# Patient Record
Sex: MALE | Race: WHITE | NOT HISPANIC OR LATINO | Employment: OTHER | URBAN - METROPOLITAN AREA
[De-identification: names, ages, dates, MRNs, and addresses within clinical notes are randomized per-mention and may not be internally consistent; named-entity substitution may affect disease eponyms.]

---

## 2021-01-01 ENCOUNTER — APPOINTMENT (INPATIENT)
Dept: RADIOLOGY | Facility: HOSPITAL | Age: 66
DRG: 871 | End: 2021-01-01
Payer: COMMERCIAL

## 2021-01-01 ENCOUNTER — HOSPITAL ENCOUNTER (INPATIENT)
Facility: HOSPITAL | Age: 66
LOS: 7 days | DRG: 871 | End: 2021-08-22
Attending: EMERGENCY MEDICINE | Admitting: ANESTHESIOLOGY
Payer: COMMERCIAL

## 2021-01-01 ENCOUNTER — APPOINTMENT (EMERGENCY)
Dept: CT IMAGING | Facility: HOSPITAL | Age: 66
End: 2021-01-01
Payer: COMMERCIAL

## 2021-01-01 ENCOUNTER — APPOINTMENT (EMERGENCY)
Dept: CT IMAGING | Facility: HOSPITAL | Age: 66
DRG: 871 | End: 2021-01-01
Payer: COMMERCIAL

## 2021-01-01 ENCOUNTER — APPOINTMENT (OUTPATIENT)
Dept: LAB | Facility: HOSPITAL | Age: 66
DRG: 871 | End: 2021-01-01
Payer: COMMERCIAL

## 2021-01-01 ENCOUNTER — APPOINTMENT (EMERGENCY)
Dept: RADIOLOGY | Facility: HOSPITAL | Age: 66
DRG: 871 | End: 2021-01-01
Payer: COMMERCIAL

## 2021-01-01 ENCOUNTER — TELEPHONE (OUTPATIENT)
Dept: HEMATOLOGY ONCOLOGY | Facility: CLINIC | Age: 66
End: 2021-01-01

## 2021-01-01 ENCOUNTER — TELEPHONE (OUTPATIENT)
Dept: UROLOGY | Facility: CLINIC | Age: 66
End: 2021-01-01

## 2021-01-01 ENCOUNTER — APPOINTMENT (INPATIENT)
Dept: ULTRASOUND IMAGING | Facility: HOSPITAL | Age: 66
DRG: 871 | End: 2021-01-01
Payer: COMMERCIAL

## 2021-01-01 ENCOUNTER — TELEPHONE (OUTPATIENT)
Dept: UROLOGY | Facility: AMBULATORY SURGERY CENTER | Age: 66
End: 2021-01-01

## 2021-01-01 ENCOUNTER — PROCEDURE VISIT (OUTPATIENT)
Dept: UROLOGY | Facility: CLINIC | Age: 66
End: 2021-01-01
Payer: COMMERCIAL

## 2021-01-01 ENCOUNTER — HOSPITAL ENCOUNTER (EMERGENCY)
Facility: HOSPITAL | Age: 66
Discharge: HOME | End: 2021-08-01
Attending: EMERGENCY MEDICINE | Admitting: EMERGENCY MEDICINE
Payer: COMMERCIAL

## 2021-01-01 ENCOUNTER — APPOINTMENT (EMERGENCY)
Dept: VASCULAR ULTRASOUND | Facility: HOSPITAL | Age: 66
End: 2021-01-01
Payer: COMMERCIAL

## 2021-01-01 ENCOUNTER — PATIENT OUTREACH (OUTPATIENT)
Dept: UROLOGY | Facility: AMBULATORY SURGERY CENTER | Age: 66
End: 2021-01-01

## 2021-01-01 VITALS
HEART RATE: 93 BPM | RESPIRATION RATE: 22 BRPM | DIASTOLIC BLOOD PRESSURE: 70 MMHG | TEMPERATURE: 97.8 F | SYSTOLIC BLOOD PRESSURE: 144 MMHG | OXYGEN SATURATION: 98 %

## 2021-01-01 VITALS
WEIGHT: 128.09 LBS | SYSTOLIC BLOOD PRESSURE: 113 MMHG | HEART RATE: 110 BPM | OXYGEN SATURATION: 74 % | HEIGHT: 69 IN | TEMPERATURE: 98.1 F | BODY MASS INDEX: 18.97 KG/M2 | RESPIRATION RATE: 22 BRPM | DIASTOLIC BLOOD PRESSURE: 79 MMHG

## 2021-01-01 VITALS
BODY MASS INDEX: 18.99 KG/M2 | WEIGHT: 128.2 LBS | DIASTOLIC BLOOD PRESSURE: 82 MMHG | HEIGHT: 69 IN | SYSTOLIC BLOOD PRESSURE: 140 MMHG | HEART RATE: 140 BPM

## 2021-01-01 DIAGNOSIS — J18.9 PNEUMONIA: ICD-10-CM

## 2021-01-01 DIAGNOSIS — J96.01 ACUTE RESPIRATORY FAILURE WITH HYPOXIA (HCC): ICD-10-CM

## 2021-01-01 DIAGNOSIS — C79.51 BONE METASTASES (HCC): ICD-10-CM

## 2021-01-01 DIAGNOSIS — N42.89 PROSTATE MASS: ICD-10-CM

## 2021-01-01 DIAGNOSIS — U07.1 COVID-19: Primary | ICD-10-CM

## 2021-01-01 DIAGNOSIS — N40.2 PROSTATE NODULE: ICD-10-CM

## 2021-01-01 DIAGNOSIS — Z71.2 ENCOUNTER TO DISCUSS TEST RESULTS: ICD-10-CM

## 2021-01-01 DIAGNOSIS — R97.20 ELEVATED PSA, GREATER THAN OR EQUAL TO 20 NG/ML: ICD-10-CM

## 2021-01-01 DIAGNOSIS — S32.609A: ICD-10-CM

## 2021-01-01 DIAGNOSIS — R93.7 ABNORMAL CT SCAN, LUMBAR SPINE: Primary | ICD-10-CM

## 2021-01-01 DIAGNOSIS — N40.2 PROSTATE NODULE: Primary | ICD-10-CM

## 2021-01-01 DIAGNOSIS — M54.50 ACUTE LOW BACK PAIN: Primary | ICD-10-CM

## 2021-01-01 DIAGNOSIS — R74.8 ALKALINE PHOSPHATASE RAISED: ICD-10-CM

## 2021-01-01 DIAGNOSIS — Z51.5 COMFORT MEASURES ONLY STATUS: ICD-10-CM

## 2021-01-01 LAB
ABO GROUP BLD: NORMAL
ABO GROUP BLD: NORMAL
ALBUMIN SERPL BCP-MCNC: 1.4 G/DL (ref 3.5–5)
ALBUMIN SERPL BCP-MCNC: 1.5 G/DL (ref 3.5–5)
ALBUMIN SERPL BCP-MCNC: 1.6 G/DL (ref 3.5–5)
ALBUMIN SERPL BCP-MCNC: 1.8 G/DL (ref 3.5–5)
ALBUMIN SERPL BCP-MCNC: 1.9 G/DL (ref 3.5–5)
ALBUMIN SERPL BCP-MCNC: 3.5 G/DL (ref 3.5–5)
ALP SERPL-CCNC: 301 U/L (ref 46–116)
ALP SERPL-CCNC: 317 U/L (ref 46–116)
ALP SERPL-CCNC: 318 U/L (ref 46–116)
ALP SERPL-CCNC: 328 U/L (ref 46–116)
ALP SERPL-CCNC: 350 U/L (ref 46–116)
ALP SERPL-CCNC: 353 U/L (ref 46–116)
ALT SERPL W P-5'-P-CCNC: 101 U/L (ref 12–78)
ALT SERPL W P-5'-P-CCNC: 27 U/L (ref 12–78)
ALT SERPL W P-5'-P-CCNC: 84 U/L (ref 12–78)
ALT SERPL W P-5'-P-CCNC: 87 U/L (ref 12–78)
ALT SERPL W P-5'-P-CCNC: 88 U/L (ref 12–78)
ALT SERPL W P-5'-P-CCNC: 89 U/L (ref 12–78)
ANION GAP SERPL CALCULATED.3IONS-SCNC: 10 MMOL/L (ref 4–13)
ANION GAP SERPL CALCULATED.3IONS-SCNC: 11 MMOL/L (ref 4–13)
ANION GAP SERPL CALCULATED.3IONS-SCNC: 11 MMOL/L (ref 4–13)
ANION GAP SERPL CALCULATED.3IONS-SCNC: 12 MMOL/L (ref 4–13)
ANION GAP SERPL CALCULATED.3IONS-SCNC: 12 MMOL/L (ref 4–13)
ANION GAP SERPL CALCULATED.3IONS-SCNC: 13 MMOL/L (ref 4–13)
ANION GAP SERPL CALCULATED.3IONS-SCNC: 9 MMOL/L (ref 4–13)
APTT PPP: 102 SECONDS (ref 23–37)
APTT PPP: 27 SECONDS (ref 23–37)
APTT PPP: 27 SECONDS (ref 23–37)
APTT PPP: 32 SECONDS (ref 23–37)
APTT PPP: 44 SECONDS (ref 23–37)
APTT PPP: 50 SECONDS (ref 23–37)
APTT PPP: 50 SECONDS (ref 23–37)
APTT PPP: 77 SECONDS (ref 23–37)
AST SERPL W P-5'-P-CCNC: 123 U/L (ref 5–45)
AST SERPL W P-5'-P-CCNC: 28 U/L (ref 5–45)
AST SERPL W P-5'-P-CCNC: 51 U/L (ref 5–45)
AST SERPL W P-5'-P-CCNC: 53 U/L (ref 5–45)
AST SERPL W P-5'-P-CCNC: 59 U/L (ref 5–45)
AST SERPL W P-5'-P-CCNC: 80 U/L (ref 5–45)
ATRIAL RATE: 100 BPM
ATRIAL RATE: 109 BPM
ATRIAL RATE: 94 BPM
BACTERIA BLD CULT: NORMAL
BACTERIA BLD CULT: NORMAL
BASOPHILS # BLD AUTO: 0.01 THOUSANDS/ΜL (ref 0–0.1)
BASOPHILS # BLD AUTO: 0.02 THOUSANDS/ΜL (ref 0–0.1)
BASOPHILS # BLD AUTO: 0.02 THOUSANDS/ΜL (ref 0–0.1)
BASOPHILS # BLD AUTO: 0.03 THOUSANDS/ΜL (ref 0–0.1)
BASOPHILS # BLD AUTO: 0.03 THOUSANDS/ΜL (ref 0–0.1)
BASOPHILS # BLD MANUAL: 0 THOUSAND/UL (ref 0–0.1)
BASOPHILS # BLD MANUAL: 0 THOUSAND/UL (ref 0–0.1)
BASOPHILS NFR BLD AUTO: 0 % (ref 0–1)
BASOPHILS NFR MAR MANUAL: 0 % (ref 0–1)
BASOPHILS NFR MAR MANUAL: 0 % (ref 0–1)
BILIRUB DIRECT SERPL-MCNC: 0.38 MG/DL (ref 0–0.2)
BILIRUB SERPL-MCNC: 0.39 MG/DL (ref 0.2–1)
BILIRUB SERPL-MCNC: 0.45 MG/DL (ref 0.2–1)
BILIRUB SERPL-MCNC: 0.46 MG/DL (ref 0.2–1)
BILIRUB SERPL-MCNC: 0.52 MG/DL (ref 0.2–1)
BILIRUB SERPL-MCNC: 0.58 MG/DL (ref 0.2–1)
BILIRUB SERPL-MCNC: 0.81 MG/DL (ref 0.2–1)
BLD GP AB SCN SERPL QL: NEGATIVE
BUN SERPL-MCNC: 26 MG/DL (ref 5–25)
BUN SERPL-MCNC: 30 MG/DL (ref 5–25)
BUN SERPL-MCNC: 32 MG/DL (ref 5–25)
BUN SERPL-MCNC: 33 MG/DL (ref 5–25)
BUN SERPL-MCNC: 35 MG/DL (ref 5–25)
CA-I BLD-SCNC: 1.08 MMOL/L (ref 1.12–1.32)
CALCIUM ALBUM COR SERPL-MCNC: 9.1 MG/DL (ref 8.3–10.1)
CALCIUM ALBUM COR SERPL-MCNC: 9.1 MG/DL (ref 8.3–10.1)
CALCIUM ALBUM COR SERPL-MCNC: 9.2 MG/DL (ref 8.3–10.1)
CALCIUM ALBUM COR SERPL-MCNC: 9.3 MG/DL (ref 8.3–10.1)
CALCIUM SERPL-MCNC: 7.1 MG/DL (ref 8.3–10.1)
CALCIUM SERPL-MCNC: 7.1 MG/DL (ref 8.3–10.1)
CALCIUM SERPL-MCNC: 7.4 MG/DL (ref 8.3–10.1)
CALCIUM SERPL-MCNC: 7.6 MG/DL (ref 8.3–10.1)
CALCIUM SERPL-MCNC: 8.5 MG/DL (ref 8.3–10.1)
CHLORIDE SERPL-SCNC: 107 MMOL/L (ref 100–108)
CHLORIDE SERPL-SCNC: 108 MMOL/L (ref 100–108)
CHLORIDE SERPL-SCNC: 110 MMOL/L (ref 100–108)
CHLORIDE SERPL-SCNC: 111 MMOL/L (ref 100–108)
CHLORIDE SERPL-SCNC: 111 MMOL/L (ref 100–108)
CHLORIDE SERPL-SCNC: 112 MMOL/L (ref 100–108)
CHLORIDE SERPL-SCNC: 99 MMOL/L (ref 100–108)
CO2 SERPL-SCNC: 19 MMOL/L (ref 21–32)
CO2 SERPL-SCNC: 20 MMOL/L (ref 21–32)
CO2 SERPL-SCNC: 21 MMOL/L (ref 21–32)
CO2 SERPL-SCNC: 21 MMOL/L (ref 21–32)
CO2 SERPL-SCNC: 23 MMOL/L (ref 21–32)
CO2 SERPL-SCNC: 26 MMOL/L (ref 21–32)
CO2 SERPL-SCNC: 26 MMOL/L (ref 21–32)
CREAT SERPL-MCNC: 0.77 MG/DL (ref 0.6–1.3)
CREAT SERPL-MCNC: 0.83 MG/DL (ref 0.6–1.3)
CREAT SERPL-MCNC: 0.85 MG/DL (ref 0.6–1.3)
CREAT SERPL-MCNC: 0.91 MG/DL (ref 0.6–1.3)
CREAT SERPL-MCNC: 0.94 MG/DL (ref 0.6–1.3)
CREAT SERPL-MCNC: 1.24 MG/DL (ref 0.6–1.3)
CREAT SERPL-MCNC: 1.31 MG/DL (ref 0.6–1.3)
CRP SERPL QL: 13.3 MG/L
CRP SERPL QL: 162.9 MG/L
CRP SERPL QL: 226.9 MG/L
CRP SERPL QL: 32.1 MG/L
D DIMER PPP FEU-MCNC: >20 UG/ML FEU
D DIMER PPP FEU-MCNC: >20 UG/ML FEU
EOSINOPHIL # BLD AUTO: 0 THOUSAND/ΜL (ref 0–0.61)
EOSINOPHIL # BLD AUTO: 0.01 THOUSAND/ΜL (ref 0–0.61)
EOSINOPHIL # BLD MANUAL: 0 THOUSAND/UL (ref 0–0.4)
EOSINOPHIL # BLD MANUAL: 0 THOUSAND/UL (ref 0–0.4)
EOSINOPHIL NFR BLD AUTO: 0 % (ref 0–6)
EOSINOPHIL NFR BLD MANUAL: 0 % (ref 0–6)
EOSINOPHIL NFR BLD MANUAL: 0 % (ref 0–6)
ERYTHROCYTE [DISTWIDTH] IN BLOOD BY AUTOMATED COUNT: 14.2 % (ref 11.6–15.1)
ERYTHROCYTE [DISTWIDTH] IN BLOOD BY AUTOMATED COUNT: 14.6 % (ref 11.6–15.1)
ERYTHROCYTE [DISTWIDTH] IN BLOOD BY AUTOMATED COUNT: 14.7 % (ref 11.6–15.1)
ERYTHROCYTE [DISTWIDTH] IN BLOOD BY AUTOMATED COUNT: 14.9 % (ref 11.6–15.1)
GFR SERPL CREATININE-BSD FRML MDRD: 56 ML/MIN/1.73SQ M
GFR SERPL CREATININE-BSD FRML MDRD: 60 ML/MIN/1.73SQ M
GFR SERPL CREATININE-BSD FRML MDRD: 84 ML/MIN/1.73SQ M
GFR SERPL CREATININE-BSD FRML MDRD: 87 ML/MIN/1.73SQ M
GFR SERPL CREATININE-BSD FRML MDRD: 90 ML/MIN/1.73SQ M
GFR SERPL CREATININE-BSD FRML MDRD: 91 ML/MIN/1.73SQ M
GFR SERPL CREATININE-BSD FRML MDRD: 94 ML/MIN/1.73SQ M
GLUCOSE SERPL-MCNC: 111 MG/DL (ref 65–140)
GLUCOSE SERPL-MCNC: 121 MG/DL (ref 65–140)
GLUCOSE SERPL-MCNC: 124 MG/DL (ref 65–140)
GLUCOSE SERPL-MCNC: 127 MG/DL (ref 65–140)
GLUCOSE SERPL-MCNC: 130 MG/DL (ref 65–140)
GLUCOSE SERPL-MCNC: 138 MG/DL (ref 65–140)
GLUCOSE SERPL-MCNC: 98 MG/DL (ref 65–140)
HBV CORE AB SER QL: NORMAL
HBV CORE IGM SER QL: NORMAL
HBV SURFACE AG SER QL: NORMAL
HCT VFR BLD AUTO: 36.4 % (ref 36.5–49.3)
HCT VFR BLD AUTO: 36.6 % (ref 36.5–49.3)
HCT VFR BLD AUTO: 37 % (ref 36.5–49.3)
HCT VFR BLD AUTO: 38 % (ref 36.5–49.3)
HCT VFR BLD AUTO: 38.1 % (ref 36.5–49.3)
HCT VFR BLD AUTO: 39.9 % (ref 36.5–49.3)
HCT VFR BLD AUTO: 43.4 % (ref 36.5–49.3)
HCT VFR BLD AUTO: 44.6 % (ref 36.5–49.3)
HCV AB SER QL: NORMAL
HGB BLD-MCNC: 11.7 G/DL (ref 12–17)
HGB BLD-MCNC: 12.1 G/DL (ref 12–17)
HGB BLD-MCNC: 12.2 G/DL (ref 12–17)
HGB BLD-MCNC: 12.2 G/DL (ref 12–17)
HGB BLD-MCNC: 12.5 G/DL (ref 12–17)
HGB BLD-MCNC: 13.4 G/DL (ref 12–17)
HGB BLD-MCNC: 13.9 G/DL (ref 12–17)
HGB BLD-MCNC: 14.5 G/DL (ref 12–17)
HIV 1+2 AB+HIV1 P24 AG SERPL QL IA: NORMAL
HIV1 P24 AG SER QL: NORMAL
IMM GRANULOCYTES # BLD AUTO: 0.03 THOUSAND/UL (ref 0–0.2)
IMM GRANULOCYTES # BLD AUTO: 0.14 THOUSAND/UL (ref 0–0.2)
IMM GRANULOCYTES # BLD AUTO: 0.2 THOUSAND/UL (ref 0–0.2)
IMM GRANULOCYTES NFR BLD AUTO: 0 % (ref 0–2)
IMM GRANULOCYTES NFR BLD AUTO: 1 % (ref 0–2)
IMM GRANULOCYTES NFR BLD AUTO: 2 % (ref 0–2)
INR PPP: 1.48 (ref 0.84–1.19)
INR PPP: 1.54 (ref 0.84–1.19)
INR PPP: 1.57 (ref 0.84–1.19)
LYMPHOCYTES # BLD AUTO: 0.44 THOUSAND/UL (ref 0.6–4.47)
LYMPHOCYTES # BLD AUTO: 0.46 THOUSAND/UL (ref 0.6–4.47)
LYMPHOCYTES # BLD AUTO: 0.57 THOUSANDS/ΜL (ref 0.6–4.47)
LYMPHOCYTES # BLD AUTO: 0.87 THOUSANDS/ΜL (ref 0.6–4.47)
LYMPHOCYTES # BLD AUTO: 0.94 THOUSANDS/ΜL (ref 0.6–4.47)
LYMPHOCYTES # BLD AUTO: 1.09 THOUSANDS/ΜL (ref 0.6–4.47)
LYMPHOCYTES # BLD AUTO: 1.12 THOUSANDS/ΜL (ref 0.6–4.47)
LYMPHOCYTES # BLD AUTO: 4 % (ref 14–44)
LYMPHOCYTES # BLD AUTO: 4 % (ref 14–44)
LYMPHOCYTES NFR BLD AUTO: 16 % (ref 14–44)
LYMPHOCYTES NFR BLD AUTO: 4 % (ref 14–44)
LYMPHOCYTES NFR BLD AUTO: 6 % (ref 14–44)
LYMPHOCYTES NFR BLD AUTO: 6 % (ref 14–44)
LYMPHOCYTES NFR BLD AUTO: 9 % (ref 14–44)
MAGNESIUM SERPL-MCNC: 2.1 MG/DL (ref 1.6–2.6)
MAGNESIUM SERPL-MCNC: 2.2 MG/DL (ref 1.6–2.6)
MAGNESIUM SERPL-MCNC: 2.4 MG/DL (ref 1.6–2.6)
MCH RBC QN AUTO: 26.7 PG (ref 26.8–34.3)
MCH RBC QN AUTO: 26.8 PG (ref 26.8–34.3)
MCH RBC QN AUTO: 26.9 PG (ref 26.8–34.3)
MCH RBC QN AUTO: 27.2 PG (ref 26.8–34.3)
MCH RBC QN AUTO: 27.2 PG (ref 26.8–34.3)
MCH RBC QN AUTO: 27.3 PG (ref 26.8–34.3)
MCH RBC QN AUTO: 27.3 PG (ref 26.8–34.3)
MCH RBC QN AUTO: 27.5 PG (ref 26.8–34.3)
MCHC RBC AUTO-ENTMCNC: 32 G/DL (ref 31.4–37.4)
MCHC RBC AUTO-ENTMCNC: 32 G/DL (ref 31.4–37.4)
MCHC RBC AUTO-ENTMCNC: 32.1 G/DL (ref 31.4–37.4)
MCHC RBC AUTO-ENTMCNC: 32.5 G/DL (ref 31.4–37.4)
MCHC RBC AUTO-ENTMCNC: 32.7 G/DL (ref 31.4–37.4)
MCHC RBC AUTO-ENTMCNC: 32.9 G/DL (ref 31.4–37.4)
MCHC RBC AUTO-ENTMCNC: 33.3 G/DL (ref 31.4–37.4)
MCHC RBC AUTO-ENTMCNC: 33.6 G/DL (ref 31.4–37.4)
MCV RBC AUTO: 81 FL (ref 82–98)
MCV RBC AUTO: 83 FL (ref 82–98)
MCV RBC AUTO: 84 FL (ref 82–98)
MCV RBC AUTO: 85 FL (ref 82–98)
MONOCYTES # BLD AUTO: 0.35 THOUSAND/UL (ref 0–1.22)
MONOCYTES # BLD AUTO: 0.44 THOUSAND/UL (ref 0–1.22)
MONOCYTES # BLD AUTO: 0.54 THOUSAND/ΜL (ref 0.17–1.22)
MONOCYTES # BLD AUTO: 0.68 THOUSAND/ΜL (ref 0.17–1.22)
MONOCYTES # BLD AUTO: 0.7 THOUSAND/ΜL (ref 0.17–1.22)
MONOCYTES # BLD AUTO: 0.76 THOUSAND/ΜL (ref 0.17–1.22)
MONOCYTES # BLD AUTO: 1.31 THOUSAND/ΜL (ref 0.17–1.22)
MONOCYTES NFR BLD AUTO: 19 % (ref 4–12)
MONOCYTES NFR BLD AUTO: 4 % (ref 4–12)
MONOCYTES NFR BLD AUTO: 4 % (ref 4–12)
MONOCYTES NFR BLD AUTO: 5 % (ref 4–12)
MONOCYTES NFR BLD AUTO: 6 % (ref 4–12)
MONOCYTES NFR BLD: 3 % (ref 4–12)
MONOCYTES NFR BLD: 4 % (ref 4–12)
MRSA NOSE QL CULT: NORMAL
NEUTROPHILS # BLD AUTO: 10.23 THOUSANDS/ΜL (ref 1.85–7.62)
NEUTROPHILS # BLD AUTO: 11.88 THOUSANDS/ΜL (ref 1.85–7.62)
NEUTROPHILS # BLD AUTO: 12.96 THOUSANDS/ΜL (ref 1.85–7.62)
NEUTROPHILS # BLD AUTO: 14.54 THOUSANDS/ΜL (ref 1.85–7.62)
NEUTROPHILS # BLD AUTO: 4.36 THOUSANDS/ΜL (ref 1.85–7.62)
NEUTROPHILS # BLD MANUAL: 10.74 THOUSAND/UL (ref 1.85–7.62)
NEUTROPHILS # BLD MANUAL: 9.86 THOUSAND/UL (ref 1.85–7.62)
NEUTS SEG NFR BLD AUTO: 65 % (ref 43–75)
NEUTS SEG NFR BLD AUTO: 83 % (ref 43–75)
NEUTS SEG NFR BLD AUTO: 88 % (ref 43–75)
NEUTS SEG NFR BLD AUTO: 89 % (ref 43–75)
NEUTS SEG NFR BLD AUTO: 90 % (ref 43–75)
NEUTS SEG NFR BLD AUTO: 91 % (ref 43–75)
NEUTS SEG NFR BLD AUTO: 93 % (ref 43–75)
NRBC BLD AUTO-RTO: 0 /100 WBCS
NT-PROBNP SERPL-MCNC: 154 PG/ML
NT-PROBNP SERPL-MCNC: 946 PG/ML
P AXIS: 49 DEGREES
P AXIS: 57 DEGREES
P AXIS: 61 DEGREES
PHOSPHATE SERPL-MCNC: 2.5 MG/DL (ref 2.3–4.1)
PHOSPHATE SERPL-MCNC: 2.5 MG/DL (ref 2.3–4.1)
PHOSPHATE SERPL-MCNC: 2.9 MG/DL (ref 2.3–4.1)
PHOSPHATE SERPL-MCNC: 3 MG/DL (ref 2.3–4.1)
PHOSPHATE SERPL-MCNC: 3.1 MG/DL (ref 2.3–4.1)
PLATELET # BLD AUTO: 174 THOUSANDS/UL (ref 149–390)
PLATELET # BLD AUTO: 228 THOUSANDS/UL (ref 149–390)
PLATELET # BLD AUTO: 232 THOUSANDS/UL (ref 149–390)
PLATELET # BLD AUTO: 251 THOUSANDS/UL (ref 149–390)
PLATELET # BLD AUTO: 254 THOUSANDS/UL (ref 149–390)
PLATELET # BLD AUTO: 269 THOUSANDS/UL (ref 149–390)
PLATELET # BLD AUTO: 272 THOUSANDS/UL (ref 149–390)
PLATELET # BLD AUTO: 279 THOUSANDS/UL (ref 149–390)
PLATELET BLD QL SMEAR: ADEQUATE
PLATELET BLD QL SMEAR: ADEQUATE
PMV BLD AUTO: 10.2 FL (ref 8.9–12.7)
PMV BLD AUTO: 10.3 FL (ref 8.9–12.7)
PMV BLD AUTO: 10.3 FL (ref 8.9–12.7)
PMV BLD AUTO: 10.4 FL (ref 8.9–12.7)
PMV BLD AUTO: 10.5 FL (ref 8.9–12.7)
PMV BLD AUTO: 9.8 FL (ref 8.9–12.7)
POTASSIUM SERPL-SCNC: 3.6 MMOL/L (ref 3.5–5.3)
POTASSIUM SERPL-SCNC: 3.8 MMOL/L (ref 3.5–5.3)
POTASSIUM SERPL-SCNC: 3.8 MMOL/L (ref 3.5–5.3)
POTASSIUM SERPL-SCNC: 4.1 MMOL/L (ref 3.5–5.3)
POTASSIUM SERPL-SCNC: 4.3 MMOL/L (ref 3.5–5.3)
POTASSIUM SERPL-SCNC: 4.4 MMOL/L (ref 3.5–5.3)
POTASSIUM SERPL-SCNC: 4.5 MMOL/L (ref 3.5–5.3)
PR INTERVAL: 114 MS
PR INTERVAL: 122 MS
PR INTERVAL: 130 MS
PREALB SERPL-MCNC: 6.1 MG/DL (ref 18–40)
PROCALCITONIN SERPL-MCNC: 0.19 NG/ML
PROCALCITONIN SERPL-MCNC: 0.25 NG/ML
PROCALCITONIN SERPL-MCNC: 0.76 NG/ML
PROCALCITONIN SERPL-MCNC: 1.03 NG/ML
PROT SERPL-MCNC: 5.6 G/DL (ref 6.4–8.2)
PROT SERPL-MCNC: 5.8 G/DL (ref 6.4–8.2)
PROT SERPL-MCNC: 5.9 G/DL (ref 6.4–8.2)
PROT SERPL-MCNC: 6 G/DL (ref 6.4–8.2)
PROT SERPL-MCNC: 7.1 G/DL (ref 6.4–8.2)
PROT SERPL-MCNC: 7.4 G/DL (ref 6.4–8.2)
PROTHROMBIN TIME: 17.2 SECONDS (ref 11.6–14.5)
PROTHROMBIN TIME: 17.8 SECONDS (ref 11.6–14.5)
PROTHROMBIN TIME: 18 SECONDS (ref 11.6–14.5)
PSA SERPL-MCNC: 220.8 NG/ML (ref 0–4)
QRS AXIS: 66 DEGREES
QRS AXIS: 67 DEGREES
QRS AXIS: 68 DEGREES
QRSD INTERVAL: 84 MS
QRSD INTERVAL: 90 MS
QRSD INTERVAL: 94 MS
QT INTERVAL: 336 MS
QT INTERVAL: 356 MS
QT INTERVAL: 374 MS
QTC INTERVAL: 420 MS
QTC INTERVAL: 479 MS
QTC INTERVAL: 482 MS
RBC # BLD AUTO: 4.36 MILLION/UL (ref 3.88–5.62)
RBC # BLD AUTO: 4.43 MILLION/UL (ref 3.88–5.62)
RBC # BLD AUTO: 4.45 MILLION/UL (ref 3.88–5.62)
RBC # BLD AUTO: 4.57 MILLION/UL (ref 3.88–5.62)
RBC # BLD AUTO: 4.59 MILLION/UL (ref 3.88–5.62)
RBC # BLD AUTO: 4.9 MILLION/UL (ref 3.88–5.62)
RBC # BLD AUTO: 5.09 MILLION/UL (ref 3.88–5.62)
RBC # BLD AUTO: 5.4 MILLION/UL (ref 3.88–5.62)
RH BLD: POSITIVE
RH BLD: POSITIVE
SARS-COV-2 RNA RESP QL NAA+PROBE: POSITIVE
SODIUM SERPL-SCNC: 137 MMOL/L (ref 136–145)
SODIUM SERPL-SCNC: 138 MMOL/L (ref 136–145)
SODIUM SERPL-SCNC: 141 MMOL/L (ref 136–145)
SODIUM SERPL-SCNC: 143 MMOL/L (ref 136–145)
SODIUM SERPL-SCNC: 143 MMOL/L (ref 136–145)
SODIUM SERPL-SCNC: 145 MMOL/L (ref 136–145)
SODIUM SERPL-SCNC: 145 MMOL/L (ref 136–145)
SPECIMEN EXPIRATION DATE: NORMAL
T WAVE AXIS: 60 DEGREES
T WAVE AXIS: 67 DEGREES
T WAVE AXIS: 69 DEGREES
TOTAL CELLS COUNTED SPEC: 100
TROPONIN I SERPL-MCNC: <0.02 NG/ML
TROPONIN I SERPL-MCNC: <0.02 NG/ML
VARIANT LYMPHS # BLD AUTO: 2 %
VENTRICULAR RATE: 100 BPM
VENTRICULAR RATE: 109 BPM
VENTRICULAR RATE: 94 BPM
WBC # BLD AUTO: 10.95 THOUSAND/UL (ref 4.31–10.16)
WBC # BLD AUTO: 11.37 THOUSAND/UL (ref 4.31–10.16)
WBC # BLD AUTO: 11.55 THOUSAND/UL (ref 4.31–10.16)
WBC # BLD AUTO: 12.31 THOUSAND/UL (ref 4.31–10.16)
WBC # BLD AUTO: 13.59 THOUSAND/UL (ref 4.31–10.16)
WBC # BLD AUTO: 14.31 THOUSAND/UL (ref 4.31–10.16)
WBC # BLD AUTO: 16.4 THOUSAND/UL (ref 4.31–10.16)
WBC # BLD AUTO: 6.83 THOUSAND/UL (ref 4.31–10.16)

## 2021-01-01 PROCEDURE — 55700 PR BIOPSY OF PROSTATE,NEEDLE/PUNCH: CPT | Performed by: UROLOGY

## 2021-01-01 PROCEDURE — G0416 PROSTATE BIOPSY, ANY MTHD: HCPCS | Performed by: PATHOLOGY

## 2021-01-01 PROCEDURE — 85610 PROTHROMBIN TIME: CPT | Performed by: NURSE PRACTITIONER

## 2021-01-01 PROCEDURE — 36415 COLL VENOUS BLD VENIPUNCTURE: CPT | Performed by: EMERGENCY MEDICINE

## 2021-01-01 PROCEDURE — 99222 1ST HOSP IP/OBS MODERATE 55: CPT | Performed by: PHYSICIAN ASSISTANT

## 2021-01-01 PROCEDURE — NC001 PR NO CHARGE: Performed by: PHYSICIAN ASSISTANT

## 2021-01-01 PROCEDURE — 99292 CRITICAL CARE ADDL 30 MIN: CPT | Performed by: NURSE PRACTITIONER

## 2021-01-01 PROCEDURE — 97167 OT EVAL HIGH COMPLEX 60 MIN: CPT

## 2021-01-01 PROCEDURE — 84145 PROCALCITONIN (PCT): CPT | Performed by: PHYSICIAN ASSISTANT

## 2021-01-01 PROCEDURE — 86704 HEP B CORE ANTIBODY TOTAL: CPT | Performed by: PHYSICIAN ASSISTANT

## 2021-01-01 PROCEDURE — 88342 IMHCHEM/IMCYTCHM 1ST ANTB: CPT | Performed by: PATHOLOGY

## 2021-01-01 PROCEDURE — 80076 HEPATIC FUNCTION PANEL: CPT | Performed by: EMERGENCY MEDICINE

## 2021-01-01 PROCEDURE — 85025 COMPLETE CBC W/AUTO DIFF WBC: CPT | Performed by: PHYSICIAN ASSISTANT

## 2021-01-01 PROCEDURE — 86901 BLOOD TYPING SEROLOGIC RH(D): CPT | Performed by: EMERGENCY MEDICINE

## 2021-01-01 PROCEDURE — 85730 THROMBOPLASTIN TIME PARTIAL: CPT | Performed by: EMERGENCY MEDICINE

## 2021-01-01 PROCEDURE — 85007 BL SMEAR W/DIFF WBC COUNT: CPT | Performed by: NURSE PRACTITIONER

## 2021-01-01 PROCEDURE — 85025 COMPLETE CBC W/AUTO DIFF WBC: CPT | Performed by: NURSE PRACTITIONER

## 2021-01-01 PROCEDURE — 94003 VENT MGMT INPAT SUBQ DAY: CPT

## 2021-01-01 PROCEDURE — 99291 CRITICAL CARE FIRST HOUR: CPT | Performed by: ANESTHESIOLOGY

## 2021-01-01 PROCEDURE — 93010 ELECTROCARDIOGRAM REPORT: CPT | Performed by: INTERNAL MEDICINE

## 2021-01-01 PROCEDURE — 85027 COMPLETE CBC AUTOMATED: CPT | Performed by: PHYSICIAN ASSISTANT

## 2021-01-01 PROCEDURE — 84153 ASSAY OF PSA TOTAL: CPT

## 2021-01-01 PROCEDURE — 99238 HOSP IP/OBS DSCHRG MGMT 30/<: CPT | Performed by: ANESTHESIOLOGY

## 2021-01-01 PROCEDURE — 86850 RBC ANTIBODY SCREEN: CPT | Performed by: EMERGENCY MEDICINE

## 2021-01-01 PROCEDURE — 86140 C-REACTIVE PROTEIN: CPT | Performed by: PHYSICIAN ASSISTANT

## 2021-01-01 PROCEDURE — 93005 ELECTROCARDIOGRAM TRACING: CPT

## 2021-01-01 PROCEDURE — 99233 SBSQ HOSP IP/OBS HIGH 50: CPT | Performed by: ANESTHESIOLOGY

## 2021-01-01 PROCEDURE — 74174 CTA ABD&PLVS W/CONTRAST: CPT

## 2021-01-01 PROCEDURE — 85379 FIBRIN DEGRADATION QUANT: CPT | Performed by: EMERGENCY MEDICINE

## 2021-01-01 PROCEDURE — U0003 INFECTIOUS AGENT DETECTION BY NUCLEIC ACID (DNA OR RNA); SEVERE ACUTE RESPIRATORY SYNDROME CORONAVIRUS 2 (SARS-COV-2) (CORONAVIRUS DISEASE [COVID-19]), AMPLIFIED PROBE TECHNIQUE, MAKING USE OF HIGH THROUGHPUT TECHNOLOGIES AS DESCRIBED BY CMS-2020-01-R: HCPCS | Performed by: EMERGENCY MEDICINE

## 2021-01-01 PROCEDURE — 88344 IMHCHEM/IMCYTCHM EA MLT ANTB: CPT | Performed by: PATHOLOGY

## 2021-01-01 PROCEDURE — 71045 X-RAY EXAM CHEST 1 VIEW: CPT

## 2021-01-01 PROCEDURE — 36415 COLL VENOUS BLD VENIPUNCTURE: CPT | Performed by: PHYSICIAN ASSISTANT

## 2021-01-01 PROCEDURE — 80048 BASIC METABOLIC PNL TOTAL CA: CPT | Performed by: PHYSICIAN ASSISTANT

## 2021-01-01 PROCEDURE — 83880 ASSAY OF NATRIURETIC PEPTIDE: CPT | Performed by: EMERGENCY MEDICINE

## 2021-01-01 PROCEDURE — 97163 PT EVAL HIGH COMPLEX 45 MIN: CPT

## 2021-01-01 PROCEDURE — 87081 CULTURE SCREEN ONLY: CPT | Performed by: NURSE PRACTITIONER

## 2021-01-01 PROCEDURE — 94760 N-INVAS EAR/PLS OXIMETRY 1: CPT

## 2021-01-01 PROCEDURE — 84484 ASSAY OF TROPONIN QUANT: CPT | Performed by: PHYSICIAN ASSISTANT

## 2021-01-01 PROCEDURE — 82330 ASSAY OF CALCIUM: CPT | Performed by: PHYSICIAN ASSISTANT

## 2021-01-01 PROCEDURE — 86705 HEP B CORE ANTIBODY IGM: CPT | Performed by: PHYSICIAN ASSISTANT

## 2021-01-01 PROCEDURE — NC001 PR NO CHARGE: Performed by: NURSE PRACTITIONER

## 2021-01-01 PROCEDURE — 85027 COMPLETE CBC AUTOMATED: CPT | Performed by: NURSE PRACTITIONER

## 2021-01-01 PROCEDURE — 84100 ASSAY OF PHOSPHORUS: CPT | Performed by: NURSE PRACTITIONER

## 2021-01-01 PROCEDURE — 83735 ASSAY OF MAGNESIUM: CPT | Performed by: NURSE PRACTITIONER

## 2021-01-01 PROCEDURE — 80053 COMPREHEN METABOLIC PANEL: CPT | Performed by: NURSE PRACTITIONER

## 2021-01-01 PROCEDURE — 96372 THER/PROPH/DIAG INJ SC/IM: CPT

## 2021-01-01 PROCEDURE — 71275 CT ANGIOGRAPHY CHEST: CPT

## 2021-01-01 PROCEDURE — 85007 BL SMEAR W/DIFF WBC COUNT: CPT | Performed by: PHYSICIAN ASSISTANT

## 2021-01-01 PROCEDURE — 84484 ASSAY OF TROPONIN QUANT: CPT | Performed by: EMERGENCY MEDICINE

## 2021-01-01 PROCEDURE — 99284 EMERGENCY DEPT VISIT MOD MDM: CPT | Performed by: PHYSICIAN ASSISTANT

## 2021-01-01 PROCEDURE — 85730 THROMBOPLASTIN TIME PARTIAL: CPT | Performed by: ANESTHESIOLOGY

## 2021-01-01 PROCEDURE — 86803 HEPATITIS C AB TEST: CPT | Performed by: PHYSICIAN ASSISTANT

## 2021-01-01 PROCEDURE — 80053 COMPREHEN METABOLIC PANEL: CPT | Performed by: PHYSICIAN ASSISTANT

## 2021-01-01 PROCEDURE — 85379 FIBRIN DEGRADATION QUANT: CPT | Performed by: STUDENT IN AN ORGANIZED HEALTH CARE EDUCATION/TRAINING PROGRAM

## 2021-01-01 PROCEDURE — 93970 EXTREMITY STUDY: CPT | Performed by: SURGERY

## 2021-01-01 PROCEDURE — 84134 ASSAY OF PREALBUMIN: CPT | Performed by: NURSE PRACTITIONER

## 2021-01-01 PROCEDURE — 87806 HIV AG W/HIV1&2 ANTB W/OPTIC: CPT | Performed by: PHYSICIAN ASSISTANT

## 2021-01-01 PROCEDURE — 99204 OFFICE O/P NEW MOD 45 MIN: CPT | Performed by: UROLOGY

## 2021-01-01 PROCEDURE — 87040 BLOOD CULTURE FOR BACTERIA: CPT | Performed by: EMERGENCY MEDICINE

## 2021-01-01 PROCEDURE — 36415 COLL VENOUS BLD VENIPUNCTURE: CPT

## 2021-01-01 PROCEDURE — 74177 CT ABD & PELVIS W/CONTRAST: CPT

## 2021-01-01 PROCEDURE — 85025 COMPLETE CBC W/AUTO DIFF WBC: CPT | Performed by: EMERGENCY MEDICINE

## 2021-01-01 PROCEDURE — 99291 CRITICAL CARE FIRST HOUR: CPT | Performed by: EMERGENCY MEDICINE

## 2021-01-01 PROCEDURE — U0005 INFEC AGEN DETEC AMPLI PROBE: HCPCS | Performed by: EMERGENCY MEDICINE

## 2021-01-01 PROCEDURE — 93971 EXTREMITY STUDY: CPT | Performed by: SURGERY

## 2021-01-01 PROCEDURE — 85610 PROTHROMBIN TIME: CPT | Performed by: EMERGENCY MEDICINE

## 2021-01-01 PROCEDURE — 86140 C-REACTIVE PROTEIN: CPT | Performed by: NURSE PRACTITIONER

## 2021-01-01 PROCEDURE — 83880 ASSAY OF NATRIURETIC PEPTIDE: CPT | Performed by: NURSE PRACTITIONER

## 2021-01-01 PROCEDURE — 84100 ASSAY OF PHOSPHORUS: CPT | Performed by: PHYSICIAN ASSISTANT

## 2021-01-01 PROCEDURE — 99285 EMERGENCY DEPT VISIT HI MDM: CPT

## 2021-01-01 PROCEDURE — 80048 BASIC METABOLIC PNL TOTAL CA: CPT | Performed by: EMERGENCY MEDICINE

## 2021-01-01 PROCEDURE — XW033E5 INTRODUCTION OF REMDESIVIR ANTI-INFECTIVE INTO PERIPHERAL VEIN, PERCUTANEOUS APPROACH, NEW TECHNOLOGY GROUP 5: ICD-10-PCS | Performed by: FAMILY MEDICINE

## 2021-01-01 PROCEDURE — 76942 ECHO GUIDE FOR BIOPSY: CPT | Performed by: UROLOGY

## 2021-01-01 PROCEDURE — 85730 THROMBOPLASTIN TIME PARTIAL: CPT | Performed by: NURSE PRACTITIONER

## 2021-01-01 PROCEDURE — 84145 PROCALCITONIN (PCT): CPT | Performed by: NURSE PRACTITIONER

## 2021-01-01 PROCEDURE — 87340 HEPATITIS B SURFACE AG IA: CPT | Performed by: PHYSICIAN ASSISTANT

## 2021-01-01 PROCEDURE — 93970 EXTREMITY STUDY: CPT

## 2021-01-01 PROCEDURE — 99291 CRITICAL CARE FIRST HOUR: CPT | Performed by: NURSE PRACTITIONER

## 2021-01-01 PROCEDURE — 94002 VENT MGMT INPAT INIT DAY: CPT

## 2021-01-01 PROCEDURE — 99284 EMERGENCY DEPT VISIT MOD MDM: CPT

## 2021-01-01 PROCEDURE — 86900 BLOOD TYPING SEROLOGIC ABO: CPT | Performed by: EMERGENCY MEDICINE

## 2021-01-01 PROCEDURE — 93971 EXTREMITY STUDY: CPT

## 2021-01-01 PROCEDURE — 96365 THER/PROPH/DIAG IV INF INIT: CPT

## 2021-01-01 RX ORDER — LORAZEPAM 2 MG/ML
1 INJECTION INTRAMUSCULAR
Status: DISCONTINUED | OUTPATIENT
Start: 2021-01-01 | End: 2021-08-23 | Stop reason: HOSPADM

## 2021-01-01 RX ORDER — FUROSEMIDE 10 MG/ML
20 INJECTION INTRAMUSCULAR; INTRAVENOUS ONCE
Status: COMPLETED | OUTPATIENT
Start: 2021-01-01 | End: 2021-01-01

## 2021-01-01 RX ORDER — POLYETHYLENE GLYCOL 3350 17 G/17G
17 POWDER, FOR SOLUTION ORAL DAILY PRN
Status: DISCONTINUED | OUTPATIENT
Start: 2021-01-01 | End: 2021-01-01

## 2021-01-01 RX ORDER — OXYCODONE HYDROCHLORIDE AND ACETAMINOPHEN 5; 325 MG/1; MG/1
1 TABLET ORAL EVERY 4 HOURS PRN
Qty: 12 TABLET | Refills: 0 | Status: SHIPPED | OUTPATIENT
Start: 2021-01-01 | End: 2021-01-01

## 2021-01-01 RX ORDER — LORAZEPAM 2 MG/ML
0.5 INJECTION INTRAMUSCULAR ONCE
Status: COMPLETED | OUTPATIENT
Start: 2021-01-01 | End: 2021-01-01

## 2021-01-01 RX ORDER — DEXAMETHASONE SODIUM PHOSPHATE 10 MG/ML
0.1 INJECTION, SOLUTION INTRAMUSCULAR; INTRAVENOUS EVERY 12 HOURS
Status: DISCONTINUED | OUTPATIENT
Start: 2021-01-01 | End: 2021-01-01

## 2021-01-01 RX ORDER — ATENOLOL 25 MG/1
25 TABLET ORAL DAILY
Status: DISCONTINUED | OUTPATIENT
Start: 2021-01-01 | End: 2021-01-01

## 2021-01-01 RX ORDER — HEPARIN SODIUM 1000 [USP'U]/ML
1650 INJECTION, SOLUTION INTRAVENOUS; SUBCUTANEOUS
Status: DISCONTINUED | OUTPATIENT
Start: 2021-01-01 | End: 2021-01-01

## 2021-01-01 RX ORDER — AMLODIPINE BESYLATE 5 MG/1
5 TABLET ORAL DAILY
Status: DISCONTINUED | OUTPATIENT
Start: 2021-01-01 | End: 2021-01-01

## 2021-01-01 RX ORDER — HYDROMORPHONE HCL 110MG/55ML
2 PATIENT CONTROLLED ANALGESIA SYRINGE INTRAVENOUS
Status: DISCONTINUED | OUTPATIENT
Start: 2021-01-01 | End: 2021-08-23 | Stop reason: HOSPADM

## 2021-01-01 RX ORDER — ACETAMINOPHEN 325 MG/1
650 TABLET ORAL EVERY 6 HOURS PRN
Status: DISCONTINUED | OUTPATIENT
Start: 2021-01-01 | End: 2021-01-01

## 2021-01-01 RX ORDER — DOCUSATE SODIUM 100 MG/1
100 CAPSULE, LIQUID FILLED ORAL 2 TIMES DAILY
Status: DISCONTINUED | OUTPATIENT
Start: 2021-01-01 | End: 2021-01-01

## 2021-01-01 RX ORDER — LORAZEPAM 2 MG/ML
0.5 INJECTION INTRAMUSCULAR
Status: DISCONTINUED | OUTPATIENT
Start: 2021-01-01 | End: 2021-01-01

## 2021-01-01 RX ORDER — SODIUM CHLORIDE 9 MG/ML
75 INJECTION, SOLUTION INTRAVENOUS CONTINUOUS
Status: DISCONTINUED | OUTPATIENT
Start: 2021-01-01 | End: 2021-01-01

## 2021-01-01 RX ORDER — DOXYCYCLINE HYCLATE 100 MG/1
100 CAPSULE ORAL EVERY 12 HOURS SCHEDULED
Status: DISCONTINUED | OUTPATIENT
Start: 2021-01-01 | End: 2021-01-01

## 2021-01-01 RX ORDER — HEPARIN SODIUM 10000 [USP'U]/100ML
3-20 INJECTION, SOLUTION INTRAVENOUS
Status: DISCONTINUED | OUTPATIENT
Start: 2021-01-01 | End: 2021-01-01

## 2021-01-01 RX ORDER — OLANZAPINE 10 MG/1
5 INJECTION, POWDER, LYOPHILIZED, FOR SOLUTION INTRAMUSCULAR ONCE
Status: COMPLETED | OUTPATIENT
Start: 2021-01-01 | End: 2021-01-01

## 2021-01-01 RX ORDER — DEXAMETHASONE SODIUM PHOSPHATE 4 MG/ML
6 INJECTION, SOLUTION INTRA-ARTICULAR; INTRALESIONAL; INTRAMUSCULAR; INTRAVENOUS; SOFT TISSUE EVERY 24 HOURS
Status: DISCONTINUED | OUTPATIENT
Start: 2021-01-01 | End: 2021-01-01

## 2021-01-01 RX ORDER — SENNOSIDES 8.6 MG
1 TABLET ORAL
Status: DISCONTINUED | OUTPATIENT
Start: 2021-01-01 | End: 2021-01-01

## 2021-01-01 RX ORDER — ATORVASTATIN CALCIUM 40 MG/1
40 TABLET, FILM COATED ORAL
Status: DISCONTINUED | OUTPATIENT
Start: 2021-01-01 | End: 2021-01-01

## 2021-01-01 RX ORDER — HYDROMORPHONE HCL/PF 1 MG/ML
0.5 SYRINGE (ML) INJECTION
Status: DISCONTINUED | OUTPATIENT
Start: 2021-01-01 | End: 2021-01-01

## 2021-01-01 RX ORDER — CEFTRIAXONE 1 G/1
1000 INJECTION, POWDER, FOR SOLUTION INTRAMUSCULAR; INTRAVENOUS ONCE
Status: COMPLETED | OUTPATIENT
Start: 2021-01-01 | End: 2021-01-01

## 2021-01-01 RX ORDER — ATENOLOL 25 MG/1
25 TABLET ORAL DAILY
COMMUNITY
End: 2021-01-01 | Stop reason: HOSPADM

## 2021-01-01 RX ORDER — HYDROMORPHONE HCL/PF 1 MG/ML
1 SYRINGE (ML) INJECTION ONCE
Status: COMPLETED | OUTPATIENT
Start: 2021-01-01 | End: 2021-01-01

## 2021-01-01 RX ORDER — LORAZEPAM 2 MG/ML
INJECTION INTRAMUSCULAR
Status: COMPLETED
Start: 2021-01-01 | End: 2021-01-01

## 2021-01-01 RX ORDER — HYDROMORPHONE HCL/PF 1 MG/ML
1 SYRINGE (ML) INJECTION
Status: DISCONTINUED | OUTPATIENT
Start: 2021-01-01 | End: 2021-01-01

## 2021-01-01 RX ORDER — ONDANSETRON 2 MG/ML
4 INJECTION INTRAMUSCULAR; INTRAVENOUS EVERY 6 HOURS PRN
Status: DISCONTINUED | OUTPATIENT
Start: 2021-01-01 | End: 2021-08-23 | Stop reason: HOSPADM

## 2021-01-01 RX ORDER — OLANZAPINE 5 MG/1
5 TABLET, ORALLY DISINTEGRATING ORAL EVERY 12 HOURS
Status: DISCONTINUED | OUTPATIENT
Start: 2021-01-01 | End: 2021-08-23 | Stop reason: HOSPADM

## 2021-01-01 RX ORDER — HEPARIN SODIUM 1000 [USP'U]/ML
3300 INJECTION, SOLUTION INTRAVENOUS; SUBCUTANEOUS
Status: DISCONTINUED | OUTPATIENT
Start: 2021-01-01 | End: 2021-01-01

## 2021-01-01 RX ORDER — POLYETHYLENE GLYCOL 3350 17 G/17G
17 POWDER, FOR SOLUTION ORAL DAILY
Status: DISCONTINUED | OUTPATIENT
Start: 2021-01-01 | End: 2021-01-01

## 2021-01-01 RX ORDER — HYDROXYZINE 50 MG/1
TABLET, FILM COATED ORAL
COMMUNITY
Start: 2021-01-01 | End: 2021-01-01 | Stop reason: HOSPADM

## 2021-01-01 RX ORDER — PANTOPRAZOLE SODIUM 20 MG/1
20 TABLET, DELAYED RELEASE ORAL
Status: DISCONTINUED | OUTPATIENT
Start: 2021-01-01 | End: 2021-01-01

## 2021-01-01 RX ADMIN — ENOXAPARIN SODIUM 60 MG: 60 INJECTION SUBCUTANEOUS at 08:42

## 2021-01-01 RX ADMIN — HYDROMORPHONE HYDROCHLORIDE 2 MG: 2 INJECTION, SOLUTION INTRAMUSCULAR; INTRAVENOUS; SUBCUTANEOUS at 14:11

## 2021-01-01 RX ADMIN — LORAZEPAM 1 MG: 2 INJECTION INTRAMUSCULAR; INTRAVENOUS at 02:20

## 2021-01-01 RX ADMIN — DEXAMETHASONE SODIUM PHOSPHATE 5.8 MG: 10 INJECTION, SOLUTION INTRAMUSCULAR; INTRAVENOUS at 05:21

## 2021-01-01 RX ADMIN — SODIUM CHLORIDE 75 ML/HR: 0.9 INJECTION, SOLUTION INTRAVENOUS at 10:21

## 2021-01-01 RX ADMIN — ENOXAPARIN SODIUM 60 MG: 60 INJECTION SUBCUTANEOUS at 08:33

## 2021-01-01 RX ADMIN — HYDROMORPHONE HYDROCHLORIDE 2 MG: 2 INJECTION, SOLUTION INTRAMUSCULAR; INTRAVENOUS; SUBCUTANEOUS at 11:16

## 2021-01-01 RX ADMIN — DOXYCYCLINE 100 MG: 100 CAPSULE ORAL at 20:55

## 2021-01-01 RX ADMIN — DOXYCYCLINE 100 MG: 100 CAPSULE ORAL at 22:24

## 2021-01-01 RX ADMIN — CEFTRIAXONE SODIUM 1000 MG: 10 INJECTION, POWDER, FOR SOLUTION INTRAVENOUS at 13:21

## 2021-01-01 RX ADMIN — DEXAMETHASONE SODIUM PHOSPHATE 5.8 MG: 10 INJECTION, SOLUTION INTRAMUSCULAR; INTRAVENOUS at 18:41

## 2021-01-01 RX ADMIN — HYDROMORPHONE HYDROCHLORIDE 1 MG: 1 INJECTION, SOLUTION INTRAMUSCULAR; INTRAVENOUS; SUBCUTANEOUS at 23:46

## 2021-01-01 RX ADMIN — DOXYCYCLINE 100 MG: 100 CAPSULE ORAL at 09:01

## 2021-01-01 RX ADMIN — LORAZEPAM 1 MG: 2 INJECTION INTRAMUSCULAR; INTRAVENOUS at 16:55

## 2021-01-01 RX ADMIN — DOXYCYCLINE 100 MG: 100 CAPSULE ORAL at 08:38

## 2021-01-01 RX ADMIN — WATER: 1 INJECTION INTRAMUSCULAR; INTRAVENOUS; SUBCUTANEOUS at 14:05

## 2021-01-01 RX ADMIN — PANTOPRAZOLE SODIUM 20 MG: 20 TABLET, DELAYED RELEASE ORAL at 05:21

## 2021-01-01 RX ADMIN — LORAZEPAM 1 MG: 2 INJECTION INTRAMUSCULAR; INTRAVENOUS at 09:09

## 2021-01-01 RX ADMIN — HYDROMORPHONE HYDROCHLORIDE 2 MG: 2 INJECTION, SOLUTION INTRAMUSCULAR; INTRAVENOUS; SUBCUTANEOUS at 00:48

## 2021-01-01 RX ADMIN — IOHEXOL 100 ML: 350 INJECTION, SOLUTION INTRAVENOUS at 17:00

## 2021-01-01 RX ADMIN — CEFTRIAXONE SODIUM 1000 MG: 10 INJECTION, POWDER, FOR SOLUTION INTRAVENOUS at 12:44

## 2021-01-01 RX ADMIN — SODIUM CHLORIDE 75 ML/HR: 0.9 INJECTION, SOLUTION INTRAVENOUS at 11:45

## 2021-01-01 RX ADMIN — DEXAMETHASONE SODIUM PHOSPHATE 5.8 MG: 10 INJECTION, SOLUTION INTRAMUSCULAR; INTRAVENOUS at 18:05

## 2021-01-01 RX ADMIN — DEXAMETHASONE SODIUM PHOSPHATE 6 MG: 4 INJECTION INTRA-ARTICULAR; INTRALESIONAL; INTRAMUSCULAR; INTRAVENOUS; SOFT TISSUE at 17:07

## 2021-01-01 RX ADMIN — HEPARIN SODIUM 12 UNITS/KG/HR: 10000 INJECTION, SOLUTION INTRAVENOUS at 12:02

## 2021-01-01 RX ADMIN — HEPARIN SODIUM 1650 UNITS: 1000 INJECTION INTRAVENOUS; SUBCUTANEOUS at 02:47

## 2021-01-01 RX ADMIN — HYDROMORPHONE HYDROCHLORIDE 1 MG: 1 INJECTION, SOLUTION INTRAMUSCULAR; INTRAVENOUS; SUBCUTANEOUS at 11:56

## 2021-01-01 RX ADMIN — DOXYCYCLINE 100 MG: 100 CAPSULE ORAL at 08:00

## 2021-01-01 RX ADMIN — CEFTRIAXONE SODIUM 1000 MG: 10 INJECTION, POWDER, FOR SOLUTION INTRAVENOUS at 12:57

## 2021-01-01 RX ADMIN — ATENOLOL 25 MG: 25 TABLET ORAL at 08:42

## 2021-01-01 RX ADMIN — ATORVASTATIN CALCIUM 40 MG: 40 TABLET, FILM COATED ORAL at 17:46

## 2021-01-01 RX ADMIN — DOXYCYCLINE 100 MG: 100 CAPSULE ORAL at 22:41

## 2021-01-01 RX ADMIN — HEPARIN SODIUM 18 UNITS/KG/HR: 10000 INJECTION, SOLUTION INTRAVENOUS at 15:14

## 2021-01-01 RX ADMIN — LORAZEPAM 0.5 MG: 2 INJECTION INTRAMUSCULAR; INTRAVENOUS at 22:36

## 2021-01-01 RX ADMIN — ENOXAPARIN SODIUM 60 MG: 60 INJECTION SUBCUTANEOUS at 22:40

## 2021-01-01 RX ADMIN — AZITHROMYCIN MONOHYDRATE 500 MG: 500 INJECTION, POWDER, LYOPHILIZED, FOR SOLUTION INTRAVENOUS at 17:13

## 2021-01-01 RX ADMIN — PANTOPRAZOLE SODIUM 20 MG: 20 TABLET, DELAYED RELEASE ORAL at 06:11

## 2021-01-01 RX ADMIN — DOXYCYCLINE 100 MG: 100 CAPSULE ORAL at 08:43

## 2021-01-01 RX ADMIN — ENOXAPARIN SODIUM 60 MG: 60 INJECTION SUBCUTANEOUS at 22:15

## 2021-01-01 RX ADMIN — DEXAMETHASONE SODIUM PHOSPHATE 5.8 MG: 10 INJECTION, SOLUTION INTRAMUSCULAR; INTRAVENOUS at 04:47

## 2021-01-01 RX ADMIN — HYDROMORPHONE HYDROCHLORIDE 1 MG: 1 INJECTION, SOLUTION INTRAMUSCULAR; INTRAVENOUS; SUBCUTANEOUS at 16:59

## 2021-01-01 RX ADMIN — HYDROMORPHONE HYDROCHLORIDE 1 MG: 1 INJECTION, SOLUTION INTRAMUSCULAR; INTRAVENOUS; SUBCUTANEOUS at 14:55

## 2021-01-01 RX ADMIN — AMLODIPINE BESYLATE 5 MG: 5 TABLET ORAL at 12:56

## 2021-01-01 RX ADMIN — LORAZEPAM 0.5 MG: 2 INJECTION INTRAMUSCULAR; INTRAVENOUS at 19:25

## 2021-01-01 RX ADMIN — LORAZEPAM 0.5 MG: 2 INJECTION INTRAMUSCULAR; INTRAVENOUS at 21:03

## 2021-01-01 RX ADMIN — HEPARIN SODIUM 1650 UNITS: 1000 INJECTION INTRAVENOUS; SUBCUTANEOUS at 09:33

## 2021-01-01 RX ADMIN — ACETAMINOPHEN 650 MG: 325 TABLET, FILM COATED ORAL at 08:32

## 2021-01-01 RX ADMIN — DEXAMETHASONE SODIUM PHOSPHATE 5.8 MG: 10 INJECTION, SOLUTION INTRAMUSCULAR; INTRAVENOUS at 16:38

## 2021-01-01 RX ADMIN — LORAZEPAM 1 MG: 2 INJECTION INTRAMUSCULAR; INTRAVENOUS at 11:16

## 2021-01-01 RX ADMIN — ATENOLOL 25 MG: 25 TABLET ORAL at 08:00

## 2021-01-01 RX ADMIN — LORAZEPAM 0.5 MG: 2 INJECTION INTRAMUSCULAR; INTRAVENOUS at 23:46

## 2021-01-01 RX ADMIN — HYDROMORPHONE HYDROCHLORIDE 1 MG: 1 INJECTION, SOLUTION INTRAMUSCULAR; INTRAVENOUS; SUBCUTANEOUS at 16:00

## 2021-01-01 RX ADMIN — PANTOPRAZOLE SODIUM 20 MG: 20 TABLET, DELAYED RELEASE ORAL at 05:00

## 2021-01-01 RX ADMIN — HYDROMORPHONE HYDROCHLORIDE 2 MG: 2 INJECTION, SOLUTION INTRAMUSCULAR; INTRAVENOUS; SUBCUTANEOUS at 16:55

## 2021-01-01 RX ADMIN — HYDROMORPHONE HYDROCHLORIDE 2 MG: 2 INJECTION, SOLUTION INTRAMUSCULAR; INTRAVENOUS; SUBCUTANEOUS at 02:19

## 2021-01-01 RX ADMIN — HYDROMORPHONE HYDROCHLORIDE 2 MG: 2 INJECTION, SOLUTION INTRAMUSCULAR; INTRAVENOUS; SUBCUTANEOUS at 19:44

## 2021-01-01 RX ADMIN — IOHEXOL 100 ML: 350 INJECTION, SOLUTION INTRAVENOUS at 13:38

## 2021-01-01 RX ADMIN — LORAZEPAM 1 MG: 2 INJECTION INTRAMUSCULAR; INTRAVENOUS at 00:48

## 2021-01-01 RX ADMIN — AMLODIPINE BESYLATE 5 MG: 5 TABLET ORAL at 08:43

## 2021-01-01 RX ADMIN — ATENOLOL 25 MG: 25 TABLET ORAL at 08:32

## 2021-01-01 RX ADMIN — SODIUM CHLORIDE 75 ML/HR: 0.9 INJECTION, SOLUTION INTRAVENOUS at 22:23

## 2021-01-01 RX ADMIN — ATORVASTATIN CALCIUM 40 MG: 40 TABLET, FILM COATED ORAL at 18:41

## 2021-01-01 RX ADMIN — PANTOPRAZOLE SODIUM 20 MG: 20 TABLET, DELAYED RELEASE ORAL at 05:54

## 2021-01-01 RX ADMIN — HYDROMORPHONE HYDROCHLORIDE 2 MG: 2 INJECTION, SOLUTION INTRAMUSCULAR; INTRAVENOUS; SUBCUTANEOUS at 09:09

## 2021-01-01 RX ADMIN — ATORVASTATIN CALCIUM 40 MG: 40 TABLET, FILM COATED ORAL at 18:05

## 2021-01-01 RX ADMIN — CEFTRIAXONE 1000 MG: 1 INJECTION, POWDER, FOR SOLUTION INTRAMUSCULAR; INTRAVENOUS at 15:10

## 2021-01-01 RX ADMIN — OLANZAPINE 5 MG: 5 TABLET, ORALLY DISINTEGRATING ORAL at 11:32

## 2021-01-01 RX ADMIN — ATENOLOL 25 MG: 25 TABLET ORAL at 09:01

## 2021-01-01 RX ADMIN — ENOXAPARIN SODIUM 60 MG: 60 INJECTION SUBCUTANEOUS at 14:33

## 2021-01-01 RX ADMIN — CEFTRIAXONE SODIUM 1000 MG: 10 INJECTION, POWDER, FOR SOLUTION INTRAVENOUS at 12:46

## 2021-01-01 RX ADMIN — HYDROMORPHONE HYDROCHLORIDE 1 MG: 1 INJECTION, SOLUTION INTRAMUSCULAR; INTRAVENOUS; SUBCUTANEOUS at 22:28

## 2021-01-01 RX ADMIN — HYDROMORPHONE HYDROCHLORIDE 1 MG: 1 INJECTION, SOLUTION INTRAMUSCULAR; INTRAVENOUS; SUBCUTANEOUS at 08:53

## 2021-01-01 RX ADMIN — ATORVASTATIN CALCIUM 40 MG: 40 TABLET, FILM COATED ORAL at 17:08

## 2021-01-01 RX ADMIN — DEXAMETHASONE SODIUM PHOSPHATE 5.8 MG: 10 INJECTION, SOLUTION INTRAMUSCULAR; INTRAVENOUS at 18:36

## 2021-01-01 RX ADMIN — DEXAMETHASONE SODIUM PHOSPHATE 5.8 MG: 10 INJECTION, SOLUTION INTRAMUSCULAR; INTRAVENOUS at 06:11

## 2021-01-01 RX ADMIN — OLANZAPINE 5 MG: 5 TABLET, ORALLY DISINTEGRATING ORAL at 09:09

## 2021-01-01 RX ADMIN — FUROSEMIDE 20 MG: 10 INJECTION, SOLUTION INTRAMUSCULAR; INTRAVENOUS at 14:06

## 2021-01-01 RX ADMIN — HYDROMORPHONE HYDROCHLORIDE 1 MG: 1 INJECTION, SOLUTION INTRAMUSCULAR; INTRAVENOUS; SUBCUTANEOUS at 22:36

## 2021-01-01 RX ADMIN — LORAZEPAM 0.5 MG: 2 INJECTION INTRAMUSCULAR; INTRAVENOUS at 03:28

## 2021-01-01 RX ADMIN — DEXAMETHASONE SODIUM PHOSPHATE 5.8 MG: 10 INJECTION, SOLUTION INTRAMUSCULAR; INTRAVENOUS at 06:00

## 2021-01-01 RX ADMIN — CEFTRIAXONE SODIUM 1000 MG: 10 INJECTION, POWDER, FOR SOLUTION INTRAVENOUS at 12:56

## 2021-01-01 RX ADMIN — LORAZEPAM 1 MG: 2 INJECTION INTRAMUSCULAR; INTRAVENOUS at 19:44

## 2021-01-01 RX ADMIN — LORAZEPAM 0.5 MG: 2 INJECTION INTRAMUSCULAR at 14:13

## 2021-01-01 RX ADMIN — REMDESIVIR 200 MG: 100 INJECTION, POWDER, LYOPHILIZED, FOR SOLUTION INTRAVENOUS at 17:08

## 2021-01-01 RX ADMIN — LORAZEPAM 0.5 MG: 2 INJECTION INTRAMUSCULAR; INTRAVENOUS at 21:11

## 2021-01-01 RX ADMIN — LORAZEPAM 0.5 MG: 2 INJECTION INTRAMUSCULAR; INTRAVENOUS at 14:13

## 2021-01-01 RX ADMIN — HYDROMORPHONE HYDROCHLORIDE 1 MG: 1 INJECTION, SOLUTION INTRAMUSCULAR; INTRAVENOUS; SUBCUTANEOUS at 19:25

## 2021-01-01 RX ADMIN — HYDROMORPHONE HYDROCHLORIDE 1 MG: 1 INJECTION, SOLUTION INTRAMUSCULAR; INTRAVENOUS; SUBCUTANEOUS at 05:14

## 2021-01-01 RX ADMIN — OLANZAPINE 5 MG: 10 INJECTION, POWDER, FOR SOLUTION INTRAMUSCULAR at 14:05

## 2021-01-01 RX ADMIN — ATORVASTATIN CALCIUM 40 MG: 40 TABLET, FILM COATED ORAL at 16:38

## 2021-01-01 RX ADMIN — CEFTRIAXONE SODIUM 1000 MG: 10 INJECTION, POWDER, FOR SOLUTION INTRAVENOUS at 12:04

## 2021-01-01 RX ADMIN — DEXAMETHASONE SODIUM PHOSPHATE 5.8 MG: 10 INJECTION, SOLUTION INTRAMUSCULAR; INTRAVENOUS at 17:46

## 2021-01-01 RX ADMIN — HYDROMORPHONE HYDROCHLORIDE 1 MG: 1 INJECTION, SOLUTION INTRAMUSCULAR; INTRAVENOUS; SUBCUTANEOUS at 21:11

## 2021-01-01 RX ADMIN — ATENOLOL 25 MG: 25 TABLET ORAL at 08:38

## 2021-01-01 RX ADMIN — LORAZEPAM 1 MG: 2 INJECTION INTRAMUSCULAR; INTRAVENOUS at 14:11

## 2021-01-01 RX ADMIN — SODIUM CHLORIDE 1000 ML: 0.9 INJECTION, SOLUTION INTRAVENOUS at 12:32

## 2021-01-01 RX ADMIN — DOXYCYCLINE 100 MG: 100 CAPSULE ORAL at 08:32

## 2021-01-01 RX ADMIN — PANTOPRAZOLE SODIUM 20 MG: 20 TABLET, DELAYED RELEASE ORAL at 06:14

## 2021-01-01 RX ADMIN — DOXYCYCLINE 100 MG: 100 CAPSULE ORAL at 22:15

## 2021-01-01 RX ADMIN — LORAZEPAM 0.5 MG: 2 INJECTION INTRAMUSCULAR; INTRAVENOUS at 22:17

## 2021-01-01 RX ADMIN — DOXYCYCLINE 100 MG: 100 CAPSULE ORAL at 21:58

## 2021-01-01 RX ADMIN — ENOXAPARIN SODIUM 60 MG: 60 INJECTION SUBCUTANEOUS at 09:01

## 2021-01-01 RX ADMIN — SODIUM CHLORIDE 75 ML/HR: 0.9 INJECTION, SOLUTION INTRAVENOUS at 20:57

## 2021-01-01 RX ADMIN — OLANZAPINE 5 MG: 5 TABLET, ORALLY DISINTEGRATING ORAL at 21:11

## 2021-01-01 RX ADMIN — DEXAMETHASONE SODIUM PHOSPHATE 5.8 MG: 10 INJECTION, SOLUTION INTRAMUSCULAR; INTRAVENOUS at 04:40

## 2021-01-01 RX ADMIN — HEPARIN SODIUM 1650 UNITS: 1000 INJECTION INTRAVENOUS; SUBCUTANEOUS at 19:04

## 2021-08-01 NOTE — ED PROVIDER NOTES
History  Chief Complaint   Patient presents with    Back Pain     degenerative dx in back, pain has been getting worse since 3 wks ago, did PT, has been taking tylenol and advil but is now getting an upset stomach      78 yo with back pain  Onset about 3 weeks ago  Left upper back  Now radiating to anterior chest  Also having low back pain that radiates to legs  Reports both legs "occasionally feel weak"  No saddle anesthesia or urinary incontinence  No fever or chills  No trauma  No h/o IVDA, malignancy or chronic steroid use  Has been trying OTC meds without relief  History provided by:  Patient   used: No    Back Pain  Location:  Lumbar spine and thoracic spine  Quality:  Aching  Radiates to:  L shoulder  Pain severity:  Moderate  Pain is:  Same all the time  Onset quality:  Gradual  Duration:  3 weeks  Timing:  Constant  Progression:  Worsening  Chronicity:  New  Relieved by:  Nothing  Worsened by: Movement  Ineffective treatments:  OTC medications  Associated symptoms: leg pain, paresthesias, tingling and weakness (b/l legs)    Associated symptoms: no abdominal pain, no abdominal swelling, no bladder incontinence, no bowel incontinence, no chest pain, no dysuria, no fever, no headaches, no numbness, no pelvic pain, no perianal numbness and no weight loss        None       Past Medical History:   Diagnosis Date    Hypertension        History reviewed  No pertinent surgical history  History reviewed  No pertinent family history  I have reviewed and agree with the history as documented  E-Cigarette/Vaping     E-Cigarette/Vaping Substances     Social History     Tobacco Use    Smoking status: Never Smoker    Smokeless tobacco: Never Used   Substance Use Topics    Alcohol use: Not on file    Drug use: Never       Review of Systems   Constitutional: Negative for chills, fever and weight loss  HENT: Negative for ear pain and sore throat      Eyes: Negative for pain and visual disturbance  Respiratory: Negative for cough and shortness of breath  Cardiovascular: Negative for chest pain and palpitations  Gastrointestinal: Negative for abdominal pain, bowel incontinence and vomiting  Genitourinary: Negative for bladder incontinence, dysuria, hematuria and pelvic pain  Musculoskeletal: Positive for back pain  Negative for arthralgias  Skin: Negative for color change and rash  Neurological: Positive for tingling, weakness (b/l legs) and paresthesias  Negative for seizures, syncope, numbness and headaches  Leg numbness, tingling, weakness     All other systems reviewed and are negative  Physical Exam  Physical Exam  Vitals and nursing note reviewed  Constitutional:       Appearance: He is well-developed  HENT:      Head: Normocephalic and atraumatic  Eyes:      Conjunctiva/sclera: Conjunctivae normal    Cardiovascular:      Rate and Rhythm: Normal rate and regular rhythm  Heart sounds: No murmur heard  Pulmonary:      Effort: Pulmonary effort is normal  No respiratory distress  Breath sounds: Normal breath sounds  Abdominal:      Palpations: Abdomen is soft  Tenderness: There is no abdominal tenderness  Musculoskeletal:      Cervical back: Neck supple  Skin:     General: Skin is warm and dry  Neurological:      Mental Status: He is alert  GCS: GCS eye subscore is 4  GCS verbal subscore is 5  GCS motor subscore is 6  Comments: GCS 15  AAOx3  Ambulating in department without difficulty  CN II-XII grossly intact  No focal neuro deficits             Vital Signs  ED Triage Vitals [08/01/21 1439]   Temperature Pulse Respirations Blood Pressure SpO2   97 8 °F (36 6 °C) 98 18 133/60 98 %      Temp Source Heart Rate Source Patient Position - Orthostatic VS BP Location FiO2 (%)   Oral Monitor Sitting Left arm --      Pain Score       --           Vitals:    08/01/21 1439 08/01/21 1845   BP: 133/60 144/70   Pulse: 98 93   Patient Position - Orthostatic VS: Sitting Sitting         Visual Acuity      ED Medications  Medications   iohexol (OMNIPAQUE) 350 MG/ML injection (SINGLE-DOSE) 100 mL (100 mL Intravenous Given 8/1/21 1700)       Diagnostic Studies  Results Reviewed     Procedure Component Value Units Date/Time    Troponin I [[id number]]  (Normal) Collected: 08/01/21 1538    Lab Status: Final result Specimen: Blood from Arm, Left Updated: 08/01/21 1605     Troponin I <0 02 ng/mL     Comprehensive metabolic panel [[id number]]  (Abnormal) Collected: 08/01/21 1538    Lab Status: Final result Specimen: Blood from Arm, Left Updated: 08/01/21 1603     Sodium 137 mmol/L      Potassium 4 5 mmol/L      Chloride 99 mmol/L      CO2 26 mmol/L      ANION GAP 12 mmol/L      BUN 33 mg/dL      Creatinine 1 31 mg/dL      Glucose 127 mg/dL      Calcium 8 5 mg/dL      AST 28 U/L      ALT 27 U/L      Alkaline Phosphatase 350 U/L      Total Protein 7 4 g/dL      Albumin 3 5 g/dL      Total Bilirubin 0 52 mg/dL      eGFR 56 ml/min/1 73sq m     Narrative:      [facility] guidelines for Chronic Kidney Disease (CKD):     Stage 1 with normal or high GFR (GFR > 90 mL/min/1 73 square meters)    Stage 2 Mild CKD (GFR = 60-89 mL/min/1 73 square meters)    Stage 3A Moderate CKD (GFR = 45-59 mL/min/1 73 square meters)    Stage 3B Moderate CKD (GFR = 30-44 mL/min/1 73 square meters)    Stage 4 Severe CKD (GFR = 15-29 mL/min/1 73 square meters)    Stage 5 End Stage CKD (GFR <15 mL/min/1 73 square meters)  Note: GFR calculation is accurate only with a steady state creatinine    CBC and differential [[id number]]  (Abnormal) Collected: 08/01/21 1538    Lab Status: Final result Specimen: Blood from Arm, Left Updated: 08/01/21 1545     WBC 6 83 Thousand/uL      RBC 5 09 Million/uL      Hemoglobin 13 9 g/dL      Hematocrit 43 4 %      MCV 85 fL      MCH 27 3 pg      MCHC 32 0 g/dL      RDW 14 2 %      MPV 9 8 fL      Platelets 499 Thousands/uL nRBC 0 /100 WBCs      Neutrophils Relative 65 %      Immat GRANS % 0 %      Lymphocytes Relative 16 %      Monocytes Relative 19 %      Eosinophils Relative 0 %      Basophils Relative 0 %      Neutrophils Absolute 4 36 Thousands/µL      Immature Grans Absolute 0 03 Thousand/uL      Lymphocytes Absolute 1 12 Thousands/µL      Monocytes Absolute 1 31 Thousand/µL      Eosinophils Absolute 0 00 Thousand/µL      Basophils Absolute 0 01 Thousands/µL                  CTA dissection protocol chest abdomen pelvis w wo contrast   Final Result by Madelaine Aguilar MD (08/01 1741)      1  Normal caliber aorta without evidence of dissection  2  Innumerable osseous lesions compatible with metastasis most prominently of the left ribs  Nondisplaced pathologic fracture of the left ischial tuberosity where there is an infiltrating lesion  3   2 1 cm hyperdense nodule at the right posterior prostate gland for which malignancy should be excluded  4    Pleural nodularity on the left adjacent to the abnormal fourth and seventh ribs where there are large infiltrating masses with associated soft tissue thickening may represent pleural metastasis  5   9 mm left omental nodule, metastasis is not excluded  6   The left common iliac vein is moderate to severely narrowed secondary to compression from the right common iliac artery as seen with May Thurner syndrome  Follow up with leg doppler US as warranted as the patient may be at risk for left leg DVT  The study was marked in [facility] for immediate notification                  Workstation performed: [id number]         VAS lower limb venous duplex study, unilateral/limited    (Results Pending)              Procedures  ECG 12 Lead Documentation Only    Date/Time: 8/1/2021 3:48 PM  Performed by: Ginger Murray PA-C  Authorized by: Ginger Murray PA-C     ECG reviewed by me, the ED Provider: yes    Patient location:  ED  Interpretation:     Interpretation: normal    Quality: Tracing quality:  Limited by artifact  Rate:     ECG rate:  94    ECG rate assessment: normal    Rhythm:     Rhythm: sinus rhythm    Ectopy:     Ectopy: none    QRS:     QRS axis:  Normal    QRS intervals:  Normal  Conduction:     Conduction: normal    ST segments:     ST segments:  Normal  T waves:     T waves: normal               ED Course                             SBIRT 20yo+      Most Recent Value   SBIRT (24 yo +)   In order to provide better care to our patients, we are screening all of our patients for alcohol and drug use  Would it be okay to ask you these screening questions? Yes Filed at: 08/01/2021 1512   Initial Alcohol Screen: US AUDIT-C    1  How often do you have a drink containing alcohol?  0 Filed at: 08/01/2021 1512   2  How many drinks containing alcohol do you have on a typical day you are drinking? 0 Filed at: 08/01/2021 1512   3a  Male UNDER 65: How often do you have five or more drinks on one occasion? 0 Filed at: 08/01/2021 1512   3b  FEMALE Any Age, or MALE 65+: How often do you have 4 or more drinks on one occassion? 0 Filed at: 08/01/2021 1512   Audit-C Score  0 Filed at: 08/01/2021 1512   JOANNE: How many times in the past year have you    Used an illegal drug or used a prescription medication for non-medical reasons? Never Filed at: 08/01/2021 1512                    MDM  Number of Diagnoses or Management Options  Acute low back pain: new and requires workup  Bone metastases [facility]): new and requires workup  Fracture ischium-closed [facility]): new and requires workup  Prostate mass: new and requires workup  Diagnosis management comments: DDx including but not limited to: sciatica, herniated disc, arthritis, spinal stenosis, strain, sprain, fracture, cauda equina syndrome, epidural abscess, AAA  Plan: CTA given back pain into chest with leg weakness          Amount and/or Complexity of Data Reviewed  Clinical lab tests: ordered and reviewed  Tests in the radiology section of CPT®: ordered and reviewed  Independent visualization of images, tracings, or specimens: yes    Risk of Complications, Morbidity, and/or Mortality  Presenting problems: moderate  Management options: low  General comments: 76 yo with back pain into chest, abd pain, extremity pain and intermittent weakness  CTA with multiple areas of bony metastasis  Pleural nodule  Omental nodule  Pathologic fracture of ischium  Lesion in prostate  Pt informed of all findings  Duplex obtained given narrowing of iliac seen and pt with leg pain  No DVT seen  Pt will need close f/u with oncology and urology given suspicious prostate lesion  He understands the importance of follow up  Return parameters provided  Pt understands and agrees with plan  Patient Progress  Patient progress: stable      Disposition  Final diagnoses:   Acute low back pain   Bone metastases ([facility] )   Prostate mass   Fracture ischium-closed ([facility] )     Time reflects when diagnosis was documented in both MDM as applicable and the Disposition within this note     Time User Action Codes Description Comment    8/1/2021  6:48 PM Maryagnes Hair Add [M54 5] Acute low back pain     8/1/2021  6:48 PM Rain Chong L Add [C79 51] Bone metastases ([facility] )     8/1/2021  6:48 PM Maryagnes Hair Add [N42 89] Prostate mass     8/1/2021  6:49 PM Maryagnes Hair Add [S32 609A] Fracture ischium-closed [facility])       ED Disposition     ED Disposition Condition Date/Time Comment    Discharge Stable Sun Aug 1, 2021  6:48 PM Nasrin Chris discharge to home/self care              Follow-up Information     Follow up With Specialties Details Why Contact Info Additional [address] [place] [address] For Urology [facility] Urology Call   [address]  [address] [address] [phone] [facility] For Urology [facility] [address] [facility]  302 [facility], [address], [address] [facility] Call   [address] [facility] [address] [address] [phone] [facility] [place], [address], [place], [place], [address] [phone]          Discharge Medication List as of 2021  6:52 PM      START taking these medications    Details   oxyCODONE-acetaminophen (PERCOCET) 5-325 mg per tablet Take 1 tablet by mouth every 4 (four) hours as needed for moderate painMax Daily Amount: 6 tablets, Starting Sun 2021, Normal           No discharge procedures on file      PDMP Review     None          ED Provider  Electronically Signed by           Andrae Bills PA-C  21

## 2021-08-02 NOTE — TELEPHONE ENCOUNTER
Reason for appointment/Complaint/Diagnosis : er follow up possible prostate mass    Insurance: [employer]  History of Cancer? no                      If yes, what kind?n/a  Previous urologist? no               Records requested/where? Epic  Outside testing/where?no    Location Preference for office visit? [place]

## 2021-08-02 NOTE — TELEPHONE ENCOUNTER
Attempt to call patient at provided number, unable to call others d/t no communications consent  Patient tentatively scheduled for trus biopsy 8/13 at  with follow up results appointment for 8/26 at 1030  When patient calls back please confirm the dates

## 2021-08-02 NOTE — TELEPHONE ENCOUNTER
Reviewed ER note and imaging  He should be scheduled with [facility] within the next 1-2 weeks with PSA prior to visit  I have placed order for PSA  He also needs to see oncology - Jessica Canales can you help coordinate? Also I would start looking at MD schedule to put on asap prostate biopsy

## 2021-08-02 NOTE — TELEPHONE ENCOUNTER
New Patient Request   Patient Details:     Kj Irizarry      1955      [id number]      Reason for Appointment   Who is calling to schedule? patient   If not Patient, what is their name? What is the diagnosis? Prostate mass   Who is the referring doctor? Michael SIDDIQUI   Scheduling Information   Which department are you scheduling with ? Hem onc   Preferred [facility] Number to call back on? If calling from the [facility], use the Nurse number   [phone]   Miscellaneous Information:     Please advise the patient, a new patient  will be calling them back within 1 business day

## 2021-08-03 NOTE — TELEPHONE ENCOUNTER
Spoke to Preeti Alvarado, is aware of appointment for biopsy and follow up path review with Dr Zulma Sparks for 8/13 and 8/26  He is looking forward to the appointments

## 2021-08-04 NOTE — TELEPHONE ENCOUNTER
Brii Null has been scheduled for prostate biopsy on 8/13/21  Please schedule Med Onc consult 1-2 weeks after biopsy

## 2021-08-04 NOTE — TELEPHONE ENCOUNTER
Message sent to [address] making them aware of date for prostate biopsy

## 2021-08-04 NOTE — TELEPHONE ENCOUNTER
New Patient  Form     Patient Details:     Philippe Truong     1955     [id number]     Background Information:     [address] starts by opening a telephone encounter and gathering the following information     Who is calling to schedule and relationship? Patient   Who is the referring provider? Corey Ma   Reason for Visit? New Diagnosis   Tumor Type? Prostate mass   Is this Cancer or Non-Cancer? Non-Cancer   Does the patient have confirmed tissue pathology with either biopsy or surgery? No   When was biopsy/surgery (diagnosis made) and where Bx scheduled 8/13   Does the Patient have a Urologist? Yes   Name of Patient's Urologist Dr Nuvia Gonzales   Did the patient have imaging done? Yes   If yes, when and where [facility]   Did patient have blood work done? Yes   If Yes, when and where [facility]   *Please contact  Navigator for review if Urology is NOT making the referral to Med Onc, or     They answer No to Urologist and Pathology question  *     Scheduling Information:     [facility]   Are there any days the patient cannot be seen? No   Are you willing to see another provider if we can schedule your visit earlier? Yes   Miscellaneous: Pt scheduled 8/30 in [facility]   After completing the above information, please route to finance, nurse navigation and clinical trials for review

## 2021-08-05 NOTE — TELEPHONE ENCOUNTER
Chart was reviewed and [address] made aware of date for prostate biopsy on separate encounter

## 2021-08-09 NOTE — PROGRESS NOTES
Kayleigh Mann was referred to Urology and Medical Oncology after his ER visit  He is scheduled for prostate biopsy 8/13/21 and consult with Dr Valeria Camp 8/30/21  Called Tahir COLMENARES [phone]  Left my direct extension and requested a call back

## 2021-08-09 NOTE — TELEPHONE ENCOUNTER
Intake received  Benefits review in process  Pt outreach to follow    Per RTE pt has an active [employer] repl plan effective 01/01/21  No deduct   Out of pocket $6700 met $303 17    Pt has an appt 08/30/21 will revw for plan & f/u w/pt

## 2021-08-11 NOTE — PATIENT INSTRUCTIONS
Prostate Biopsy   WHAT YOU NEED TO KNOW:   A prostate biopsy is a procedure to remove samples of tissue from your prostate gland  The prostate is a male sex gland that makes fluid found in semen  It is located just below the bladder  After the samples are removed, they are sent to a lab and tested for cancer  DISCHARGE INSTRUCTIONS:   Seek care immediately if:   · You have heavy bleeding from your rectum  · You urinate very little or not at all  · You have pain from your procedure that gets worse, even after you take pain medicine  Contact your healthcare provider if:   · You have a fever or chills  · You feel pain or burning when you urinate  · Your urine is cloudy or smells bad  · You have questions or concerns about your condition or care  Medicines:  · Medicines  can help decrease pain  You may need medicine to prevent or treat a bacterial infection  Ask how to take pain medicine safely  · Take your medicine as directed  Contact your healthcare provider if you think your medicine is not helping or if you have side effects  Tell him or her if you are allergic to any medicine  Keep a list of the medicines, vitamins, and herbs you take  Include the amounts, and when and why you take them  Bring the list or the pill bottles to follow-up visits  Carry your medicine list with you in case of an emergency  Follow up with your healthcare provider or urologist as directed: You may need to return for more tests or procedures  Write down your questions so you remember to ask them during your visits  © Copyright [employer] 2021 Information is for End User's use only and may not be sold, redistributed or otherwise used for commercial purposes  All illustrations and images included in CareNotes® are the copyrighted property of [employer]  or Kasey Ramirez  The above information is an  only   It is not intended as medical advice for individual conditions or treatments  Talk to your doctor, nurse or pharmacist before following any medical regimen to see if it is safe and effective for you

## 2021-08-11 NOTE — TELEPHONE ENCOUNTER
Call placed to both primary number and patients EC number, both to VM  Left brief message to call office back  When patient returns call, please make aware to get PSA done TODAY or fridays appointment will be rescheduled

## 2021-08-11 NOTE — PROGRESS NOTES
Problem List Items Addressed This Visit        Other    Elevated PSA, greater than or equal to 20 ng/ml    Abnormal CT scan, lumbar spine - Primary    Relevant Medications    cefTRIAXone (ROCEPHIN) injection 1,000 mg (Completed)    Other Relevant Orders    NM bone scan whole body    Alkaline phosphatase raised    Relevant Medications    cefTRIAXone (ROCEPHIN) injection 1,000 mg (Completed)    Other Relevant Orders    NM bone scan whole body               David Krause did well with his biopsy today  I reviewed with him his imaging findings and his PSA suggesting metastatic prostate cancer  He will need Malcom Shaka at his next visit if his biopsy is positive, he will also need referral to Medical Oncology for advanced androgen deprivation therapy in the long-term for metastatic castrate sensitive prostate cancer    Assessment and plan:       Please see problem oriented charting for the assessment plan of today's urological complaints    Anurag Miles MD      Chief Complaint     Chief Complaint   Patient presents with    Prostate Biopsy         History of Present Illness     Lupis Ghosh is a 77 y o  man with back pain and discomfort, imaging suggests metastatic prostate cancer  PSA has returned as 220, s/p biopsy just now, did well  Will return to results review and firmagon to be followed by lupron, will need hematology/medical oncology follow-up for advanced ADT    ECOG of 1 now    States he is voiding well    The following portions of the patient's history were reviewed and updated as appropriate: allergies, current medications, past family history, past medical history, past social history, past surgical history and problem list       Detailed Urologic History     - please refer to HPI    Review of Systems     Review of Systems   Constitutional: Negative  HENT: Negative  Eyes: Negative  Respiratory: Negative  Cardiovascular: Negative  Gastrointestinal: Negative  Endocrine: Negative  Genitourinary: Negative  Musculoskeletal: Negative  Skin: Negative  Allergic/Immunologic: Negative  Neurological: Negative  Hematological: Negative  Psychiatric/Behavioral: Negative  Allergies     No Known Allergies    Physical Exam     Physical Exam  Vitals reviewed  Constitutional:       General: He is not in acute distress  Appearance: Normal appearance  He is ill-appearing  He is not toxic-appearing or diaphoretic  HENT:      Head: Normocephalic and atraumatic  Mouth/Throat:      Comments: Poor dentition  Eyes:      General: No scleral icterus  Right eye: No discharge  Left eye: No discharge  Cardiovascular:      Pulses: Normal pulses  Pulmonary:      Effort: Pulmonary effort is normal    Abdominal:      General: There is no distension  Palpations: There is no mass  Tenderness: There is no abdominal tenderness  Hernia: No hernia is present  Genitourinary:     Comments: Firm prostate  Musculoskeletal:         General: No swelling  Skin:     General: Skin is warm  Neurological:      General: No focal deficit present  Mental Status: He is alert and oriented to person, place, and time  Cranial Nerves: No cranial nerve deficit  Psychiatric:         Mood and Affect: Mood normal          Behavior: Behavior normal          Thought Content: Thought content normal          Judgment: Judgment normal              Vital Signs  Vitals:    08/13/21 1428   BP: 140/82   Pulse: (!) 140   Weight: 58 2 kg (128 lb 3 2 oz)   Height: 5' 9" (1 753 m)         Current Medications       Current Outpatient Medications:     atenolol (TENORMIN) 25 mg tablet, Take 25 mg by mouth daily, Disp: , Rfl:     hydrOXYzine HCL (ATARAX) 50 mg tablet, , Disp: , Rfl:   No current facility-administered medications for this visit        Active Problems     Patient Active Problem List   Diagnosis    Elevated PSA, greater than or equal to 20 ng/ml    Abnormal CT scan, lumbar spine    Alkaline phosphatase raised         Past Medical History     Past Medical History:   Diagnosis Date    Hypertension          Surgical History     Past Surgical History:   Procedure Laterality Date    PROSTATE BIOPSY  08/13/2021    Dr Ilir Bower         Family History     History reviewed  No pertinent family history  Social History     Social History     Social History     Tobacco Use   Smoking Status Never Smoker   Smokeless Tobacco Never Used         Pertinent Lab Values     Lab Results   Component Value Date    CREATININE 1 31 (H) 08/01/2021       Lab Results   Component Value Date     8 (H) 08/12/2021             Pertinent Imaging      images reviewed , innumerable osseous metastases, concern for metastatic prostate cancer

## 2021-08-11 NOTE — TELEPHONE ENCOUNTER
We will call the patient to have him get PSA testing, I am not able to see any PSA in our current medical record

## 2021-08-11 NOTE — PROGRESS NOTES
Called Tahir's home phone  Made him aware to have PSA done today or tomorrow  He stated that he could go to [facility] tomorrow  Reviewed all of his pending appointments  He did not know that there is a prep for the biopsy  Made him aware that I would have a nurse from Urology call him today to review

## 2021-08-12 NOTE — PROGRESS NOTES
Called and spoke to Cholo Trammell, thankd him for getting PSA done, reviwed pre instructions of prostate biopsy, advise stopping blood thinners, advises he does not take any, advised use of fleet enema 2 hours prior to appointment, aware he can  at any pharmacy  Advised to eat and drink normally  Made patient aware of need for abx injection  Made patient aware to arrive 30 minutes early for injection  Patient able to verbalize understanding

## 2021-08-13 PROBLEM — R93.7 ABNORMAL CT SCAN, LUMBAR SPINE: Status: ACTIVE | Noted: 2021-01-01

## 2021-08-13 PROBLEM — R74.8 ALKALINE PHOSPHATASE RAISED: Status: ACTIVE | Noted: 2021-01-01

## 2021-08-13 PROBLEM — R97.20 ELEVATED PSA, GREATER THAN OR EQUAL TO 20 NG/ML: Status: ACTIVE | Noted: 2021-01-01

## 2021-08-13 NOTE — PROGRESS NOTES
Office TRUS-guided Prostate Biopsy Procedure Note    Indication    Elevated PSA    Informed consent   The risks, benefits and alternatives to TRUS-guided prostate biopsy were conveyed to the patient prior to performing the procedure  A discussion of the risks of the procedure included, but was not limited to: pain, hematuria, hematochezia, hematospermia, infection, and the possibility of a non-diagnostic biopsy  The patient was given the opportunity to have his questions answered and there was no perceived barrier to education  Antibiotic prophylaxis   The patient received the following antibiotics at least 30 minutes prior to undergoing biopsy: ceftriaxone  The patient was instructed to continue taking the antibiotics as prescribed for a total of 1 days, including the day of biopsy  Rectal cleansing  The patient was instructed to perform an evacuating rectal enema 1-2 hours prior to biopsy  He was unable to complete this however    Local anesthesia  Topical 2% lidocaine jelly was applied liberally to the anus and rectum and allowed to dwell for at least 5 min prior to starting the procedure  After insertion of the TRUS probe, 10 mL of 2% lidocaine solution was injected with ultrasound guidance at the  junction of the prostate and seminal vesicles  The anesthetic was allowed to dwell for at least 2 minutes prior to biopsy  Transrectal ultrasonography  The patient was placed in the left lateral decubitus position  After an attentive digital rectal examination, a 7 5 mHz sidefire ultrasound probe was gently inserted into the rectum and biplanar imaging of the prostate was done with the findings noted below  Images were taken of any abnormal findings and also to document prostate size      Bladder  The bladder base appeared normal     Prostate  Digital rectal exam findings:  -  Slightly enlarged prostate, somewhat firm    Ultrasound size measurements:  -Volume:  26 98 cm3    Ultrasound findings:  -Cysts: None  -Masses: None  -Median lobe: absent    Clinical stage (assuming a positive biopsy):   -T4a  TRUS-guided needle biopsy  Using an 18 gauge biopsy needle and ultrasound guidance, the following biopsies were taken:    1 core(s) from the left lateral base  1 core(s) from the left lateral mid-gland  1 core(s) from the right middle base  1 core(s) from the right lateral base  1 core(s) from the left lateral mid-gland  1 core(s) from the left middle mid-gland  1 core(s) from the right middle mid-gland  1 core(s) from the right lateral mid-gland  1 core(s) from the left lateral apex  1 core(s) from the left middle apex  1 core(s) from the right middle apex  1 core(s) from the right lateral apex    Total number of cores: 12                Complications  There were no procedural complications  Disposition  The patient was dismissed to home     Post-procedure instructions: Today he underwent an uncomplicated transrectal ultrasound-guided biopsy of the prostate, following a periprosthetic nerve block  I reviewed the normal postprocedure a course including bleeding per rectum, hematuria, and hematospermia  I instructed him to complete his course of antibiotics as prescribed  Instructed him to call with fever greater than 101, chills, nausea, vomiting, and poorly controlled pain  His followup was scheduled in approximately 2 weeks' time to review the pathology  Biopsy prostate     Date/Time 8/13/2021 3:46 PM     Performed by  Betty Tolentino MD     Authorized by Betty Tolentino MD      Universal Protocol   Consent: Verbal consent obtained  Written consent obtained    Risks and benefits: risks, benefits and alternatives were discussed  Consent given by: patient  Patient understanding: patient states understanding of the procedure being performed  Patient consent: the patient's understanding of the procedure matches consent given  Procedure consent: procedure consent matches procedure scheduled  Relevant documents: relevant documents present and verified  Test results: test results available and properly labeled  Site marked: the operative site was not marked  Radiology Images displayed and confirmed  If images not available, report reviewed: imaging studies available  Required items: required blood products, implants, devices, and special equipment available  Patient identity confirmed: verbally with patient and provided demographic data        Local anesthesia used: yes     Anesthesia   Local anesthesia used: yes  Local Anesthetic: lidocaine 2% without epinephrine     Sedation   Patient sedated: no        Specimen: yes    Culture: no   Procedure Details   Procedure Notes: Office TRUS-guided Prostate Biopsy Procedure Note    Indication    Elevated PSA    Informed consent   The risks, benefits and alternatives to TRUS-guided prostate biopsy were conveyed to the patient prior to performing the procedure  A discussion of the risks of the procedure included, but was not limited to: pain, hematuria, hematochezia, hematospermia, infection, and the possibility of a non-diagnostic biopsy  The patient was given the opportunity to have his questions answered and there was no perceived barrier to education  Antibiotic prophylaxis   The patient received the following antibiotics at least 30 minutes prior to undergoing biopsy: ceftriaxone  The patient was instructed to continue taking the antibiotics as prescribed for a total of 1 days, including the day of biopsy  Rectal cleansing  The patient was instructed to perform an evacuating rectal enema 1-2 hours prior to biopsy  He was unable to complete this however    Local anesthesia  Topical 2% lidocaine jelly was applied liberally to the anus and rectum and allowed to dwell for at least 5 min prior to starting the procedure    After insertion of the TRUS probe, 10 mL of 2% lidocaine solution was injected with ultrasound guidance at the junction of the prostate and seminal vesicles  The anesthetic was allowed to dwell for at least 2 minutes prior to biopsy  Transrectal ultrasonography  The patient was placed in the left lateral decubitus position  After an attentive digital rectal examination, a 7 5 mHz sidefire ultrasound probe was gently inserted into the rectum and biplanar imaging of the prostate was done with the findings noted below  Images were taken of any abnormal findings and also to document prostate size  Bladder  The bladder base appeared normal     Prostate  Digital rectal exam findings:  -  Slightly enlarged prostate, somewhat firm    Ultrasound size measurements:  -Volume:  26 98 cm3    Ultrasound findings:  -Cysts: None  -Masses: None  -Median lobe: absent    Clinical stage (assuming a positive biopsy):   -T4a  TRUS-guided needle biopsy  Using an 18 gauge biopsy needle and ultrasound guidance, the following biopsies were taken:    1 core(s) from the left lateral base  1 core(s) from the left lateral mid-gland  1 core(s) from the right middle base  1 core(s) from the right lateral base  1 core(s) from the left lateral mid-gland  1 core(s) from the left middle mid-gland  1 core(s) from the right middle mid-gland  1 core(s) from the right lateral mid-gland  1 core(s) from the left lateral apex  1 core(s) from the left middle apex  1 core(s) from the right middle apex  1 core(s) from the right lateral apex    Total number of cores: 12                Complications  There were no procedural complications  Disposition  The patient was dismissed to home     Post-procedure instructions: Today he underwent an uncomplicated transrectal ultrasound-guided biopsy of the prostate, following a periprosthetic nerve block  I reviewed the normal postprocedure a course including bleeding per rectum, hematuria, and hematospermia  I instructed him to complete his course of antibiotics as prescribed   Instructed him to call with fever greater than 101, chills, nausea, vomiting, and poorly controlled pain  His followup was scheduled in approximately 2 weeks' time to review the pathology    Patient Transportation: confirmed  Patient tolerance: patient tolerated the procedure well with no immediate complications

## 2021-08-13 NOTE — LETTER
August 13, 2021     Gin Woodruff MD  [address] [place] [id number] [facility]  [facility]  [address]    Patient: Kimberly Carney   YOB: 1955   Date of Visit: 8/13/2021       Dear Dr Gin Woodruff:    Thank you for referring Kimberly Careny to me for evaluation  Below are my notes for this consultation  If you have questions, please do not hesitate to call me  I look forward to following your patient along with you  Sincerely,        Sean Ortiz MD        CC: Kranthi Chapman PA-C  [facility]  Analilia Rusesll, JOSLYN Ortiz MD  8/13/2021  3:47 PM  Sign when Signing Visit  Office TRUS-guided Prostate Biopsy Procedure Note    Indication    Elevated PSA    Informed consent   The risks, benefits and alternatives to TRUS-guided prostate biopsy were conveyed to the patient prior to performing the procedure  A discussion of the risks of the procedure included, but was not limited to: pain, hematuria, hematochezia, hematospermia, infection, and the possibility of a non-diagnostic biopsy  The patient was given the opportunity to have his questions answered and there was no perceived barrier to education  Antibiotic prophylaxis   The patient received the following antibiotics at least 30 minutes prior to undergoing biopsy: ceftriaxone  The patient was instructed to continue taking the antibiotics as prescribed for a total of 1 days, including the day of biopsy  Rectal cleansing  The patient was instructed to perform an evacuating rectal enema 1-2 hours prior to biopsy  He was unable to complete this however    Local anesthesia  Topical 2% lidocaine jelly was applied liberally to the anus and rectum and allowed to dwell for at least 5 min prior to starting the procedure  After insertion of the TRUS probe, 10 mL of 2% lidocaine solution was injected with ultrasound guidance at the  junction of the prostate and seminal vesicles  The anesthetic was allowed to dwell for at least 2 minutes prior to biopsy      Transrectal ultrasonography  The patient was placed in the left lateral decubitus position  After an attentive digital rectal examination, a 7 5 mHz sidefire ultrasound probe was gently inserted into the rectum and biplanar imaging of the prostate was done with the findings noted below  Images were taken of any abnormal findings and also to document prostate size  Bladder  The bladder base appeared normal     Prostate  Digital rectal exam findings:  -  Slightly enlarged prostate, somewhat firm    Ultrasound size measurements:  -Volume:  26 98 cm3    Ultrasound findings:  -Cysts: None  -Masses: None  -Median lobe: absent    Clinical stage (assuming a positive biopsy):   -T4a  TRUS-guided needle biopsy  Using an 18 gauge biopsy needle and ultrasound guidance, the following biopsies were taken:    1 core(s) from the left lateral base  1 core(s) from the left lateral mid-gland  1 core(s) from the right middle base  1 core(s) from the right lateral base  1 core(s) from the left lateral mid-gland  1 core(s) from the left middle mid-gland  1 core(s) from the right middle mid-gland  1 core(s) from the right lateral mid-gland  1 core(s) from the left lateral apex  1 core(s) from the left middle apex  1 core(s) from the right middle apex  1 core(s) from the right lateral apex    Total number of cores: 12                Complications  There were no procedural complications  Disposition  The patient was dismissed to home     Post-procedure instructions: Today he underwent an uncomplicated transrectal ultrasound-guided biopsy of the prostate, following a periprosthetic nerve block  I reviewed the normal postprocedure a course including bleeding per rectum, hematuria, and hematospermia  I instructed him to complete his course of antibiotics as prescribed  Instructed him to call with fever greater than 101, chills, nausea, vomiting, and poorly controlled pain   His followup was scheduled in approximately 2 weeks' time to review the pathology  Biopsy prostate     Date/Time 8/13/2021 3:46 PM     Performed by  Dane Escamilla MD     Authorized by Dane Escamilla MD      Mobile Protocol   Consent: Verbal consent obtained  Written consent obtained  Risks and benefits: risks, benefits and alternatives were discussed  Consent given by: patient  Patient understanding: patient states understanding of the procedure being performed  Patient consent: the patient's understanding of the procedure matches consent given  Procedure consent: procedure consent matches procedure scheduled  Relevant documents: relevant documents present and verified  Test results: test results available and properly labeled  Site marked: the operative site was not marked  Radiology Images displayed and confirmed  If images not available, report reviewed: imaging studies available  Required items: required blood products, implants, devices, and special equipment available  Patient identity confirmed: verbally with patient and provided demographic data        Local anesthesia used: yes     Anesthesia   Local anesthesia used: yes  Local Anesthetic: lidocaine 2% without epinephrine     Sedation   Patient sedated: no        Specimen: yes    Culture: no   Procedure Details   Procedure Notes: Office TRUS-guided Prostate Biopsy Procedure Note    Indication    Elevated PSA    Informed consent   The risks, benefits and alternatives to TRUS-guided prostate biopsy were conveyed to the patient prior to performing the procedure  A discussion of the risks of the procedure included, but was not limited to: pain, hematuria, hematochezia, hematospermia, infection, and the possibility of a non-diagnostic biopsy  The patient was given the opportunity to have his questions answered and there was no perceived barrier to education  Antibiotic prophylaxis   The patient received the following antibiotics at least 30 minutes prior to undergoing biopsy: ceftriaxone   The patient was instructed to continue taking the antibiotics as prescribed for a total of 1 days, including the day of biopsy  Rectal cleansing  The patient was instructed to perform an evacuating rectal enema 1-2 hours prior to biopsy  He was unable to complete this however    Local anesthesia  Topical 2% lidocaine jelly was applied liberally to the anus and rectum and allowed to dwell for at least 5 min prior to starting the procedure  After insertion of the TRUS probe, 10 mL of 2% lidocaine solution was injected with ultrasound guidance at the  junction of the prostate and seminal vesicles  The anesthetic was allowed to dwell for at least 2 minutes prior to biopsy  Transrectal ultrasonography  The patient was placed in the left lateral decubitus position  After an attentive digital rectal examination, a 7 5 mHz sidefire ultrasound probe was gently inserted into the rectum and biplanar imaging of the prostate was done with the findings noted below  Images were taken of any abnormal findings and also to document prostate size  Bladder  The bladder base appeared normal     Prostate  Digital rectal exam findings:  -  Slightly enlarged prostate, somewhat firm    Ultrasound size measurements:  -Volume:  26 98 cm3    Ultrasound findings:  -Cysts: None  -Masses: None  -Median lobe: absent    Clinical stage (assuming a positive biopsy):   -T4a  TRUS-guided needle biopsy  Using an 18 gauge biopsy needle and ultrasound guidance, the following biopsies were taken:    1 core(s) from the left lateral base  1 core(s) from the left lateral mid-gland  1 core(s) from the right middle base  1 core(s) from the right lateral base  1 core(s) from the left lateral mid-gland  1 core(s) from the left middle mid-gland  1 core(s) from the right middle mid-gland  1 core(s) from the right lateral mid-gland  1 core(s) from the left lateral apex  1 core(s) from the left middle apex  1 core(s) from the right middle apex    1 core(s) from the right lateral apex    Total number of cores: 12                Complications  There were no procedural complications  Disposition  The patient was dismissed to home     Post-procedure instructions: Today he underwent an uncomplicated transrectal ultrasound-guided biopsy of the prostate, following a periprosthetic nerve block  I reviewed the normal postprocedure a course including bleeding per rectum, hematuria, and hematospermia  I instructed him to complete his course of antibiotics as prescribed  Instructed him to call with fever greater than 101, chills, nausea, vomiting, and poorly controlled pain  His followup was scheduled in approximately 2 weeks' time to review the pathology  Patient Transportation: confirmed  Patient tolerance: patient tolerated the procedure well with no immediate complications                       Betty Tolentino MD  8/13/2021  3:45 PM  Sign when Signing Visit       Problem List Items Addressed This Visit        Other    Elevated PSA, greater than or equal to 20 ng/ml    Abnormal CT scan, lumbar spine - Primary    Relevant Medications    cefTRIAXone (ROCEPHIN) injection 1,000 mg (Completed)    Other Relevant Orders    NM bone scan whole body    Alkaline phosphatase raised    Relevant Medications    cefTRIAXone (ROCEPHIN) injection 1,000 mg (Completed)    Other Relevant Orders    NM bone scan whole body               Kayleigh Mann did well with his biopsy today  I reviewed with him his imaging findings and his PSA suggesting metastatic prostate cancer    He will need Glory Potter at his next visit if his biopsy is positive, he will also need referral to Medical Oncology for advanced androgen deprivation therapy in the long-term for metastatic castrate sensitive prostate cancer    Assessment and plan:       Please see problem oriented charting for the assessment plan of today's urological complaints    Betty Tolentino MD      Chief Complaint     Chief Complaint   Patient presents with    Prostate Biopsy         History of Present Illness     Tim Pena is a 77 y o  man with back pain and discomfort, imaging suggests metastatic prostate cancer  PSA has returned as 220, s/p biopsy just now, did well  Will return to results review and firmagon to be followed by lupron, will need hematology/medical oncology follow-up for advanced ADT    ECOG of 1 now    States he is voiding well    The following portions of the patient's history were reviewed and updated as appropriate: allergies, current medications, past family history, past medical history, past social history, past surgical history and problem list       Detailed Urologic History     - please refer to HPI    Review of Systems     Review of Systems   Constitutional: Negative  HENT: Negative  Eyes: Negative  Respiratory: Negative  Cardiovascular: Negative  Gastrointestinal: Negative  Endocrine: Negative  Genitourinary: Negative  Musculoskeletal: Negative  Skin: Negative  Allergic/Immunologic: Negative  Neurological: Negative  Hematological: Negative  Psychiatric/Behavioral: Negative  Allergies     No Known Allergies    Physical Exam     Physical Exam  Vitals reviewed  Constitutional:       General: He is not in acute distress  Appearance: Normal appearance  He is ill-appearing  He is not toxic-appearing or diaphoretic  HENT:      Head: Normocephalic and atraumatic  Mouth/Throat:      Comments: Poor dentition  Eyes:      General: No scleral icterus  Right eye: No discharge  Left eye: No discharge  Cardiovascular:      Pulses: Normal pulses  Pulmonary:      Effort: Pulmonary effort is normal    Abdominal:      General: There is no distension  Palpations: There is no mass  Tenderness: There is no abdominal tenderness  Hernia: No hernia is present     Genitourinary:     Comments: Firm prostate  Musculoskeletal:         General: No swelling  Skin:     General: Skin is warm  Neurological:      General: No focal deficit present  Mental Status: He is alert and oriented to person, place, and time  Cranial Nerves: No cranial nerve deficit  Psychiatric:         Mood and Affect: Mood normal          Behavior: Behavior normal          Thought Content: Thought content normal          Judgment: Judgment normal              Vital Signs  Vitals:    08/13/21 1428   BP: 140/82   Pulse: (!) 140   Weight: 58 2 kg (128 lb 3 2 oz)   Height: 5' 9" (1 753 m)         Current Medications       Current Outpatient Medications:     atenolol (TENORMIN) 25 mg tablet, Take 25 mg by mouth daily, Disp: , Rfl:     hydrOXYzine HCL (ATARAX) 50 mg tablet, , Disp: , Rfl:   No current facility-administered medications for this visit  Active Problems     Patient Active Problem List   Diagnosis    Elevated PSA, greater than or equal to 20 ng/ml    Abnormal CT scan, lumbar spine    Alkaline phosphatase raised         Past Medical History     Past Medical History:   Diagnosis Date    Hypertension          Surgical History     Past Surgical History:   Procedure Laterality Date    PROSTATE BIOPSY  08/13/2021    Dr India Randolph         Family History     History reviewed  No pertinent family history  Social History     Social History     Social History     Tobacco Use   Smoking Status Never Smoker   Smokeless Tobacco Never Used         Pertinent Lab Values     Lab Results   Component Value Date    CREATININE 1 31 (H) 08/01/2021       Lab Results   Component Value Date     8 (H) 08/12/2021             Pertinent Imaging      images reviewed , innumerable osseous metastases, concern for metastatic prostate cancer

## 2021-08-15 PROBLEM — A41.9 SEVERE SEPSIS (HCC): Status: ACTIVE | Noted: 2021-01-01

## 2021-08-15 PROBLEM — E43 SEVERE PROTEIN-CALORIE MALNUTRITION (HCC): Status: ACTIVE | Noted: 2021-01-01

## 2021-08-15 PROBLEM — R65.20 SEVERE SEPSIS (HCC): Status: ACTIVE | Noted: 2021-01-01

## 2021-08-15 PROBLEM — I10 ESSENTIAL HYPERTENSION: Status: ACTIVE | Noted: 2021-01-01

## 2021-08-15 PROBLEM — K92.1 COMPLAINT OF MELENA: Status: ACTIVE | Noted: 2021-01-01

## 2021-08-15 PROBLEM — U07.1 COVID-19: Status: ACTIVE | Noted: 2021-01-01

## 2021-08-15 PROBLEM — C79.9 METASTATIC DISEASE (HCC): Status: ACTIVE | Noted: 2021-01-01

## 2021-08-15 PROBLEM — R09.02 HYPOXEMIA: Status: ACTIVE | Noted: 2021-01-01

## 2021-08-15 PROBLEM — I87.1 MAY-THURNER SYNDROME: Status: ACTIVE | Noted: 2021-01-01

## 2021-08-15 PROBLEM — J96.01 ACUTE HYPOXEMIC RESPIRATORY FAILURE DUE TO COVID-19 (HCC): Status: ACTIVE | Noted: 2021-01-01

## 2021-08-15 NOTE — ED PROVIDER NOTES
History  Chief Complaint   Patient presents with    Shortness of Breath     pt c/o cough, sob and weakness for the past few weeks, per ems pts O2 sat was 74% on RA       History provided by:  Patient   used: No    Shortness of Breath  Severity:  Severe  Onset quality:  Gradual  Duration:  2 weeks  Timing:  Constant  Progression:  Worsening  Chronicity:  New  Relieved by:  Rest  Worsened by:  Exertion  Ineffective treatments:  None tried  Associated symptoms: no abdominal pain, no chest pain, no cough, no ear pain, no fever, no rash, no sore throat and no vomiting        Prior to Admission Medications   Prescriptions Last Dose Informant Patient Reported? Taking?   atenolol (TENORMIN) 25 mg tablet 8/15/2021 at Unknown time Self Yes Yes   Sig: Take 25 mg by mouth daily   hydrOXYzine HCL (ATARAX) 50 mg tablet Not Taking at Unknown time Self Yes No   Patient not taking: Reported on 8/13/2021      Facility-Administered Medications: None       Past Medical History:   Diagnosis Date    Hypertension        Past Surgical History:   Procedure Laterality Date    PROSTATE BIOPSY  08/13/2021    Dr Feliciano [place]       History reviewed  No pertinent family history  I have reviewed and agree with the history as documented  E-Cigarette/Vaping    E-Cigarette Use Never User      E-Cigarette/Vaping Substances    Nicotine No     THC No     CBD No     Flavoring No     Other No     Unknown No      Social History     Tobacco Use    Smoking status: Never Smoker    Smokeless tobacco: Never Used   Vaping Use    Vaping Use: Never used   Substance Use Topics    Alcohol use: Not on file    Drug use: Never       Review of Systems   Constitutional: Negative for chills and fever  HENT: Negative for ear pain and sore throat  Eyes: Negative for pain and visual disturbance  Respiratory: Positive for shortness of breath  Negative for cough  Cardiovascular: Negative for chest pain and palpitations  Gastrointestinal: Negative for abdominal pain, diarrhea and vomiting  Genitourinary: Negative for dysuria and hematuria  Musculoskeletal: Negative for arthralgias and back pain  Skin: Negative for color change and rash  Neurological: Negative for seizures and syncope  All other systems reviewed and are negative  Physical Exam  Physical Exam  Vitals and nursing note reviewed  Constitutional:       Appearance: He is well-developed  HENT:      Head: Normocephalic and atraumatic  Eyes:      Conjunctiva/sclera: Conjunctivae normal    Cardiovascular:      Rate and Rhythm: Normal rate and regular rhythm  Heart sounds: No murmur heard  Pulmonary:      Effort: Tachypnea and respiratory distress present  Comments: Tachypnea and hypoxia reported to mid 70s by EMS  At the time of my initial evaluation the patient was saturating 87 to 89% on 4 L of O2 by nasal cannula  He continued to be tachypneic with increased work of breathing  He was able to speak in relatively complete sentences well on oxygen by nasal cannula  Abdominal:      Palpations: Abdomen is soft  Tenderness: There is no abdominal tenderness  Musculoskeletal:      Cervical back: Neck supple  Skin:     General: Skin is warm and dry  Capillary Refill: Capillary refill takes less than 2 seconds  Neurological:      General: No focal deficit present  Mental Status: He is alert and oriented to person, place, and time           Vital Signs  ED Triage Vitals [08/15/21 1158]   Temp Pulse Respirations Blood Pressure SpO2   -- (!) 110 (!) 26 119/71 (!) 85 %      Temp Source Heart Rate Source Patient Position - Orthostatic VS BP Location FiO2 (%)   Oral Monitor Sitting Right arm --      Pain Score       --           Vitals:    08/15/21 1158 08/15/21 1230 08/15/21 1300   BP: 119/71 109/72 120/68   Pulse: (!) 110 (!) 107 96   Patient Position - Orthostatic VS: Sitting           Visual Acuity      ED Medications  Medications   sodium chloride 0 9 % bolus 1,000 mL (0 mL Intravenous Stopped 8/15/21 1434)   ceftriaxone (ROCEPHIN) 1 g/50 mL in dextrose IVPB (1,000 mg Intravenous New Bag 8/15/21 1246)   iohexol (OMNIPAQUE) 350 MG/ML injection (SINGLE-DOSE) 100 mL (100 mL Intravenous Given 8/15/21 1338)   enoxaparin (LOVENOX) subcutaneous injection 60 mg (60 mg Subcutaneous Given 8/15/21 1433)       Diagnostic Studies  Results Reviewed     Procedure Component Value Units Date/Time    Novel Coronavirus (Covid-19),PCR [facility] - 2 Hour Stat [[id number]]  (Abnormal) Collected: 08/15/21 1232    Lab Status: Final result Specimen: Nares from Nose Updated: 08/15/21 1423     SARS-CoV-2 Positive    Narrative: The specimen collection materials, transport medium, and/or testing methodology utilized in the production of these test results have been proven to be reliable in a limited validation with an abbreviated program under the Emergency Utilization Authorization provided by the FDA  Testing reported as "Presumptive positive" will be confirmed with secondary testing to ensure result accuracy  Clinical caution and judgement should be used with the interpretation of these results with consideration of the clinical impression and other laboratory testing  Testing reported as "Positive" or "Negative" has been proven to be accurate according to standard laboratory validation requirements  All testing is performed with control materials showing appropriate reactivity at standard intervals        CBC and differential [[id number]]  (Abnormal) Collected: 08/15/21 1230    Lab Status: Final result Specimen: Blood from Arm, Right Updated: 08/15/21 1334     WBC 14 31 Thousand/uL      RBC 5 40 Million/uL      Hemoglobin 14 5 g/dL      Hematocrit 44 6 %      MCV 83 fL      MCH 26 9 pg      MCHC 32 5 g/dL      RDW 14 6 %      MPV 10 4 fL      Platelets 804 Thousands/uL      nRBC 0 /100 WBCs      Neutrophils Relative 91 %      Immat GRANS % 1 %      Lymphocytes Relative 4 %      Monocytes Relative 4 %      Eosinophils Relative 0 %      Basophils Relative 0 %      Neutrophils Absolute 12 96 Thousands/µL      Immature Grans Absolute 0 20 Thousand/uL      Lymphocytes Absolute 0 57 Thousands/µL      Monocytes Absolute 0 54 Thousand/µL      Eosinophils Absolute 0 01 Thousand/µL      Basophils Absolute 0 03 Thousands/µL     Protime-INR [[id number]]  (Abnormal) Collected: 08/15/21 1230    Lab Status: Final result Specimen: Blood from Arm, Right Updated: 08/15/21 1320     Protime 18 0 seconds      INR 1 57    APTT [[id number]]  (Normal) Collected: 08/15/21 1230    Lab Status: Final result Specimen: Blood from Arm, Right Updated: 08/15/21 1320     PTT 27 seconds     D-Dimer [[id number]]  (Abnormal) Collected: 08/15/21 1230    Lab Status: Final result Specimen: Blood from Arm, Right Updated: 08/15/21 1318     D-Dimer, Quant >20 00 ug/ml FEU     Hepatic function panel [[id number]]  (Abnormal) Collected: 08/15/21 1230    Lab Status: Final result Specimen: Blood from Arm, Right Updated: 08/15/21 1301     Total Bilirubin 0 81 mg/dL      Bilirubin, Direct 0 38 mg/dL      Alkaline Phosphatase 353 U/L      AST 80 U/L      ALT 84 U/L      Total Protein 7 1 g/dL      Albumin 1 8 g/dL     NT-BNP PRO [[id number]]  (Abnormal) Collected: 08/15/21 1230    Lab Status: Final result Specimen: Blood from Arm, Right Updated: 08/15/21 1301     NT-proBNP 154 pg/mL     Troponin I [[id number]]  (Normal) Collected: 08/15/21 1230    Lab Status: Final result Specimen: Blood from Arm, Right Updated: 08/15/21 1257     Troponin I <0 02 ng/mL     Basic metabolic panel [[id number]]  (Abnormal) Collected: 08/15/21 1230    Lab Status: Final result Specimen: Blood from Arm, Right Updated: 08/15/21 1254     Sodium 145 mmol/L      Potassium 3 6 mmol/L      Chloride 107 mmol/L      CO2 26 mmol/L      ANION GAP 12 mmol/L      BUN 35 mg/dL      Creatinine 1 24 mg/dL      Glucose 130 mg/dL      Calcium 7 6 mg/dL      eGFR 60 ml/min/1 73sq m     Narrative:      [place] guidelines for Chronic Kidney Disease (CKD):     Stage 1 with normal or high GFR (GFR > 90 mL/min/1 73 square meters)    Stage 2 Mild CKD (GFR = 60-89 mL/min/1 73 square meters)    Stage 3A Moderate CKD (GFR = 45-59 mL/min/1 73 square meters)    Stage 3B Moderate CKD (GFR = 30-44 mL/min/1 73 square meters)    Stage 4 Severe CKD (GFR = 15-29 mL/min/1 73 square meters)    Stage 5 End Stage CKD (GFR <15 mL/min/1 73 square meters)  Note: GFR calculation is accurate only with a steady state creatinine    Blood culture #1 [[id number]] Collected: 08/15/21 1232    Lab Status: In process Specimen: Blood from Arm, Left Updated: 08/15/21 1237    Blood culture #2 [[id number]] Collected: 08/15/21 1231    Lab Status: In process Specimen: Blood from Arm, Right Updated: 08/15/21 1237                 PE Study with CT Abdomen and Pelvis with contrast   Final Result by Declan Cheung MD (08/15 1416)      Compared to 10 days ago, new bilateral lung airspace disease most compatible with infection, pattern can be seen with Covid 19 pneumonia  No pulmonary embolism identified  New small areas of density in the thoracicoabdominal aorta resembling atherosclerotic plaque, questioned progressed atherosclerotic disease versus adherent emboli  No significant stenosis and no dissection  Other findings similar to 10 days ago, including diffuse bone metastases with evidence of pathologic left ischium fracture, described above  Workstation performed: [id number]         XR chest 1 view portable   ED Interpretation by Charles Vazquez MD (08/15 1230)   Peripheral infiltrates      Final Result by Kylie Mcbride MD (08/15 1307)      Patchy bilateral groundglass opacity, question Covid 19 pneumonia  Redemonstration of left rib metastases                    Workstation performed: [id number]                    Procedures  CriticalCare Time  Performed by: Nesha Hawthorne MD  Authorized by: Nesha Hawthorne MD     Critical care provider statement:     Critical care time (minutes):  55    Critical care time was exclusive of:  Separately billable procedures and treating other patients and teaching time    Critical care was necessary to treat or prevent imminent or life-threatening deterioration of the following conditions:  Respiratory failure    Critical care was time spent personally by me on the following activities:  Blood draw for specimens, development of treatment plan with patient or surrogate, obtaining history from patient or surrogate, discussions with consultants, evaluation of patient's response to treatment, examination of patient, ordering and performing treatments and interventions, ordering and review of laboratory studies, ordering and review of radiographic studies, re-evaluation of patient's condition and review of old charts    I assumed direction of critical care for this patient from another provider in my specialty: no    Comments:      Acute hypoxic respiratory failure requiring supplemental oxygen  ED Course             HEART Risk Score      Most Recent Value   Heart Score Risk Calculator   History  0 Filed at: 08/15/2021 1354   ECG  0 Filed at: 08/15/2021 1354   Age  2 Filed at: 08/15/2021 1354   Risk Factors  1 Filed at: 08/15/2021 1354   Troponin  0 Filed at: 08/15/2021 1354   HEART Score  3 Filed at: 08/15/2021 1354        Identification of Seniors at Risk      Most Recent Value   (ISAR) Identification of Seniors at Risk   Before the illness or injury that brought you to the Emergency, did you need someone to help you on a regular basis? 0 Filed at: 08/15/2021 1200   In the last 24 hours, have you needed more help than usual?  0 Filed at: 08/15/2021 1200   Have you been hospitalized for one or more nights during the past 6 months?   0 Filed at: 08/15/2021 1200   In general, do you see well?  0 Filed at: 08/15/2021 1200   In general, do you have serious problems with your memory? 0 Filed at: 08/15/2021 1200   Do you take more than three different medications every day?  0 Filed at: 08/15/2021 1200   ISAR Score  0 Filed at: 08/15/2021 1200                Initial Sepsis Screening     Row Name 08/15/21 6124                Is the patient's history suggestive of a new or worsening infection? (!) Yes (Proceed)  -OK        Suspected source of infection  pneumonia  -OK        Are two or more of the following signs & symptoms of infection both present and new to the patient? (!) Yes (Proceed)  -OK        Indicate SIRS criteria  Tachycardia > 90 bpm;Tachypnea > 20 resp per min;Leukocytosis (WBC > 41829 IJL)  -OK        If the answer is yes to both questions, suspicion of sepsis is present  --        If severe sepsis is present AND tissue hypoperfusion perists in the hour after fluid resuscitation or lactate > 4, the patient meets criteria for SEPTIC SHOCK  --        Are any of the following organ dysfunction criteria present within 6 hours of suspected infection and SIRS criteria that are NOT considered to be chronic conditions? (!) Yes  -OK        Organ dysfunction  INR > 1 5 or aPTT > 60 secs; Acute respiratory failure (new need for invasive or non-invasive mechanical ventilation)  -OK        Date of presentation of severe sepsis  --        Time of presentation of severe sepsis  --        Tissue hypoperfusion persists in the hour after crystalloid fluid administration, evidenced, by either:  --        Was hypotension present within one hour of the conclusion of crystalloid fluid administration?  --        Date of presentation of septic shock  --        Time of presentation of septic shock  --          User Key  (r) = Recorded By, (t) = Taken By, (c) = Cosigned By    [address] Name Provider Type    OK Greg Davison PA-C Physician Assistant          SBIRT 20yo+      Most Recent Value   SBIRT (23 yo +)   In order to provide better care to our patients, we are screening all of our patients for alcohol and drug use  Would it be okay to ask you these screening questions? Yes Filed at: 08/15/2021 1320   Initial Alcohol Screen: US AUDIT-C    1  How often do you have a drink containing alcohol?  0 Filed at: 08/15/2021 1320   2  How many drinks containing alcohol do you have on a typical day you are drinking? 0 Filed at: 08/15/2021 1320   3a  Male UNDER 65: How often do you have five or more drinks on one occasion? 0 Filed at: 08/15/2021 1320   Audit-C Score  0 Filed at: 08/15/2021 1320   JOANNE: How many times in the past year have you    Used an illegal drug or used a prescription medication for non-medical reasons? Never Filed at: 08/15/2021 1320                    MDM  Number of Diagnoses or Management Options  Acute respiratory failure with hypoxia ([facility] [address] ): new and requires workup  COVID-19: new and requires workup  Pneumonia: new and requires workup  Diagnosis management comments: 57-year-old male presented for evaluation of shortness of breath and general malaise worsening over the past days and weeks  He was found to have a large bilateral ground-glass opacities consistent with COVID-19 pneumonia  His COVID test was positive  A D-dimer was greater than 20,000 and so a CTA of the chest was performed to rule out PE  Was negative for PE but demonstrated diffuse bilateral infiltrates consistent with COVID pneumonia as well as redemonstrated multiple osseous lesions likely metastasis from suspected primary prostate cancer  Initially he was a severely hypoxic on room air a but this did improve with supplemental oxygen by nasal cannula  Oxygen saturations in the low to mid 90s on 6 L nasal cannula and otherwise hemodynamically stable at time of admission  Ordered 1st dose of Lovenox for D-dimer greater than 20, ceftriaxone for antibiotic coverage  Discussed with [facility] for admission         Amount and/or Complexity of Data Reviewed  Clinical lab tests: ordered and reviewed  Tests in the radiology section of CPT®: ordered and reviewed  Review and summarize past medical records: yes  Discuss the patient with other providers: yes  Independent visualization of images, tracings, or specimens: yes        Disposition  Final diagnoses:   Pneumonia   Hypoxia   COVID-19     Time reflects when diagnosis was documented in both MDM as applicable and the Disposition within this note     Time User Action Codes Description Comment    8/15/2021  1:52 PM Valerie Tennille Add [J18 9] Pneumonia     8/15/2021  1:53 PM Valerie Tennille Add [R09 02] Hypoxia     8/15/2021  3:02 PM Josiah Lithuanian Add [U07 1] COVID-19     8/15/2021  3:25 PM Valerie Tennille Modify [U07 1] COVID-19       ED Disposition     ED Disposition Condition Date/Time Comment    Admit Stable Sun Aug 15, 2021  1:53 PM Case was discussed with [facility] and the patient's admission status was agreed to be Admission Status: inpatient status to the service of Dr Valentin Hearn  Follow-up Information    None         Patient's Medications   Discharge Prescriptions    No medications on file     No discharge procedures on file      PDMP Review     None          ED Provider  Electronically Signed by           Eduardo Oneill MD  08/15/21 0253

## 2021-08-15 NOTE — ASSESSMENT & PLAN NOTE
Patient on atenolol at home  Will maintain on atenolol 25 for now   BP stable for now, continue to monitor

## 2021-08-15 NOTE — ASSESSMENT & PLAN NOTE
Patient brought in by EMS and was found to have spO2 of 70s, placed on 6L O2 and returned to 90%  While examining in the ED, patient was in the high 80s on NC, increased oxygen to 7L  Respirations running around 25-30  Goal is to maintain oxygen >90%  Consulted pulmonology due to increasing needs for oxygen

## 2021-08-15 NOTE — PLAN OF CARE
Problem: Potential for Falls  Goal: Patient will remain free of falls  Description: INTERVENTIONS:  - Educate patient/family on patient safety including physical limitations  - Instruct patient to call for assistance with activity   - Consult OT/PT to assist with strengthening/mobility   - Keep Call bell within reach  - Keep bed low and locked with side rails adjusted as appropriate  - Keep care items and personal belongings within reach  - Initiate and maintain comfort rounds  - Make Fall Risk Sign visible to staff  - Offer Toileting every  Hours, in advance of need  - Initiate/Maintain alarm  - Obtain necessary fall risk management equipment:   - Apply yellow socks and bracelet for high fall risk patients  - Consider moving patient to room near nurses station  8/15/2021 1938 by Bridger Dunn  Outcome: Progressing  8/15/2021 1938 by Bridger Dunn  Outcome: Progressing     Problem: PAIN - ADULT  Goal: Verbalizes/displays adequate comfort level or baseline comfort level  Description: Interventions:  - Encourage patient to monitor pain and request assistance  - Assess pain using appropriate pain scale  - Administer analgesics based on type and severity of pain and evaluate response  - Implement non-pharmacological measures as appropriate and evaluate response  - Consider cultural and social influences on pain and pain management  - Notify physician/advanced practitioner if interventions unsuccessful or patient reports new pain  Outcome: Progressing     Problem: INFECTION - ADULT  Goal: Absence or prevention of progression during hospitalization  Description: INTERVENTIONS:  - Assess and monitor for signs and symptoms of infection  - Monitor lab/diagnostic results  - Monitor all insertion sites, i e  indwelling lines, tubes, and drains  - Monitor endotracheal if appropriate and nasal secretions for changes in amount and color  - [place] appropriate cooling/warming therapies per order  - Administer medications as ordered  - Instruct and encourage patient and family to use good hand hygiene technique  - Identify and instruct in appropriate isolation precautions for identified infection/condition  Outcome: Progressing  Goal: Absence of fever/infection during neutropenic period  Description: INTERVENTIONS:  - Monitor WBC    Outcome: Progressing     Problem: SAFETY ADULT  Goal: Patient will remain free of falls  Description: INTERVENTIONS:  - Educate patient/family on patient safety including physical limitations  - Instruct patient to call for assistance with activity   - Consult OT/PT to assist with strengthening/mobility   - Keep Call bell within reach  - Keep bed low and locked with side rails adjusted as appropriate  - Keep care items and personal belongings within reach  - Initiate and maintain comfort rounds  - Make Fall Risk Sign visible to staff  - Offer Toileting every  Hours, in advance of need  - Initiate/Maintain alarm  - Obtain necessary fall risk management equipment:   - Apply yellow socks and bracelet for high fall risk patients  - Consider moving patient to room near nurses station  8/15/2021 1938 by Melodie Leavitt  Outcome: Progressing  8/15/2021 1938 by Melodie Leavitt  Outcome: Progressing  Goal: Maintain or return to baseline ADL function  Description: INTERVENTIONS:  -  Assess patient's ability to carry out ADLs; assess patient's baseline for ADL function and identify physical deficits which impact ability to perform ADLs (bathing, care of mouth/teeth, toileting, grooming, dressing, etc )  - Assess/evaluate cause of self-care deficits   - Assess range of motion  - Assess patient's mobility; develop plan if impaired  - Assess patient's need for assistive devices and provide as appropriate  - Encourage maximum independence but intervene and supervise when necessary  - Involve family in performance of ADLs  - Assess for home care needs following discharge   - Consider OT consult to assist with ADL evaluation and planning for discharge  - Provide patient education as appropriate  Outcome: Progressing  Goal: Maintains/Returns to pre admission functional level  Description: INTERVENTIONS:  - Perform BMAT or MOVE assessment daily    - Set and communicate daily mobility goal to care team and patient/family/caregiver  - Collaborate with rehabilitation services on mobility goals if consulted  - Perform Range of Motion  times a day  - Reposition patient every  hours  - Dangle patient  times a day  - Stand patient  times a day  - Ambulate patient  times a day  - Out of bed to chair  times a day   - Out of bed for meals  times a day  - Out of bed for toileting  - Record patient progress and toleration of activity level   Outcome: Progressing     Problem: DISCHARGE PLANNING  Goal: Discharge to home or other facility with appropriate resources  Description: INTERVENTIONS:  - Identify barriers to discharge w/patient and caregiver  - Arrange for needed discharge resources and transportation as appropriate  - Identify discharge learning needs (meds, wound care, etc )  - Arrange for interpretive services to assist at discharge as needed  - Refer to Case Management Department for coordinating discharge planning if the patient needs post-hospital services based on physician/advanced practitioner order or complex needs related to functional status, cognitive ability, or social support system  Outcome: Progressing     Problem: Knowledge Deficit  Goal: Patient/family/caregiver demonstrates understanding of disease process, treatment plan, medications, and discharge instructions  Description: Complete learning assessment and assess knowledge base    Interventions:  - Provide teaching at level of understanding  - Provide teaching via preferred learning methods  Outcome: Progressing

## 2021-08-15 NOTE — ASSESSMENT & PLAN NOTE
Meets mild criteria with needs for O2 via nasal cannula, does have increasing needs for oxygen and may meet requirements for severe soon  D dimer >20 Given 60 mg lovenox in ED, will maintain on Lovenox, no signs of PE on CTA  Blood type O positive  Ceftriaxone x 1 given in the ED, blood cultures x 2 drawn and pending  Dexamethasone 60 mg started  Atorvastatin 40 mg started  Will start remdesivir  Did not order tocilizumab pending CRP level, will consider if >90  Self prone as able  Up and OOB as tolerated  SCD placed  CMP, CRP, CBC to be followed daily

## 2021-08-15 NOTE — ASSESSMENT & PLAN NOTE
Sepsis criteria met with HR >90, tachycardia, leukocytosis  Severe sepsis met with INR > 1 5 and acute respiratory failure with needs for O2 supplemental  Secondary to COVID pneumonia  Plan as noted below

## 2021-08-15 NOTE — ASSESSMENT & PLAN NOTE
Hemoglobin is currently stable  Patient reports that he has been having black, tarry stools  He does appear pale on examination  Will order occult blood x 3   Continue to monitor CBC

## 2021-08-15 NOTE — H&P
[facility]  H&P- Dyane Listen 1955, 77 y o  male MRN: [id number]  Unit/Bed#: ED 29 Encounter: [id number]  Primary Care Provider: Elisabeth Smith MD   Date and time admitted to hospital: 8/15/2021 11:55 AM    Severe sepsis [facility])  Assessment & Plan  Sepsis criteria met with HR >90, tachycardia, leukocytosis  Severe sepsis met with INR > 1 5 and acute respiratory failure with needs for O2 supplemental  Secondary to COVID pneumonia  Plan as noted below    * COVID-19  Assessment & Plan  Meets mild criteria with needs for O2 via nasal cannula, does have increasing needs for oxygen and may meet requirements for severe soon  D dimer >20 Given 60 mg lovenox in ED, will maintain on Lovenox, no signs of PE on CTA  Blood type O positive  Ceftriaxone x 1 given in the ED, blood cultures x 2 drawn and pending  Dexamethasone 60 mg started  Atorvastatin 40 mg started  Will start remdesivir  Did not order tocilizumab pending CRP level, will consider if >90  Self prone as able  Up and OOB as tolerated  SCD placed  CMP, CRP, CBC to be followed daily    Hypoxemia  Assessment & Plan  Patient brought in by EMS and was found to have spO2 of 70s, placed on 6L O2 and returned to 90%  While examining in the ED, patient was in the high 80s on NC, increased oxygen to 7L  Respirations running around 25-30  Goal is to maintain oxygen >90%  Consulted pulmonology due to increasing needs for oxygen      Essential hypertension  Assessment & Plan  Patient on atenolol at home  Will maintain on atenolol 25 for now   BP stable for now, continue to monitor    Complaint of melena  Assessment & Plan  Hemoglobin is currently stable  Patient reports that he has been having black, tarry stools  He does appear pale on examination  Will order occult blood x 3   Continue to monitor CBC      VTE Pharmacologic Prophylaxis: VTE Score: 5 High Risk (Score >/= 5) - Pharmacological DVT Prophylaxis Ordered: enoxaparin (Lovenox)   Sequential Compression Devices Ordered  Code Status: No Order   Discussion with family: Patient declined call to   Anticipated Length of Stay: Patient will be admitted on an inpatient basis with an anticipated length of stay of greater than 2 midnights secondary to COVID-19 treatments including increasing oxygen, IV remdesivir, IV steroids, and pulm consult  Total Time for Visit, including Counseling / Coordination of Care: 35 Greater than 50% of this total time spent on direct patient counseling and coordination of care  Chief Complaint: SOB    History of Present Illness:  Nivia Gant is a 77 y o  male with a PMH of HTN who presents with hypoxia and SOB  Reported to the ED that he was having cough, SOB, and weakness for the past two weeks; however, told me he was not having any coughs but did have weakness and shortness of breath  He called EMS and patient was found to have spO2 of 74% on room air and was provided 6L O2 via nasal cannula and his oxygen sat returned to 90%  Patient has not had his COVID vaccination and has had no known contacts or any recent travel  He has not had any fevers or chills, abdominal pain, chest pain, swelling, pain in his extremities, headaches, urine output has been normal  He reports that he has not had an appetite and his smell and taste have decreased  He has felt dehydrated and has felt the need to increase his water intake  He did note melena that he has been experiencing but no diarrhea or constipation  Review of Systems:  Review of Systems   Constitutional: Positive for appetite change and fatigue  Negative for chills and fever  HENT: Negative for congestion, rhinorrhea, sneezing and sore throat  Eyes: Negative for photophobia and visual disturbance  Respiratory: Positive for cough (reported in the ED) and shortness of breath  Negative for chest tightness  Cardiovascular: Negative for chest pain, palpitations and leg swelling     Gastrointestinal: Positive for blood in stool  Negative for abdominal pain, constipation (melena), diarrhea, nausea and vomiting  Endocrine: Positive for polydipsia  Negative for polyphagia and polyuria  Genitourinary: Negative for difficulty urinating, dysuria, frequency and urgency  Musculoskeletal: Negative for arthralgias and myalgias  Skin: Negative for rash and wound  Neurological: Positive for weakness  Negative for light-headedness, numbness and headaches  Hematological: Negative for adenopathy  Does not bruise/bleed easily  Psychiatric/Behavioral: Negative for behavioral problems, sleep disturbance and suicidal ideas  The patient is not nervous/anxious  Past Medical and Surgical History:   Past Medical History:   Diagnosis Date    Hypertension        Past Surgical History:   Procedure Laterality Date    PROSTATE BIOPSY  08/13/2021    Dr Carr Cancel       Meds/Allergies:  Prior to Admission medications    Medication Sig Start Date End Date Taking? Authorizing Provider   atenolol (TENORMIN) 25 mg tablet Take 25 mg by mouth daily   Yes Historical Provider, MD   hydrOXYzine HCL (ATARAX) 50 mg tablet  7/7/21   Historical Provider, MD     I have reviewed home medications with patient personally  Allergies: No Known Allergies    Social History:  Marital Status: /Civil Union   Occupation: used to be a [employer]  Patient Pre-hospital Living Situation: Home, With spouse  Patient Pre-hospital Level of Mobility: walks  Patient Pre-hospital Diet Restrictions: none  Substance Use History:   Social History     Substance and Sexual Activity   Alcohol Use None     Social History     Tobacco Use   Smoking Status Never Smoker   Smokeless Tobacco Never Used     Social History     Substance and Sexual Activity   Drug Use Never       Family History:  History reviewed  No pertinent family history      Physical Exam:     Vitals:   Blood Pressure: 120/68 (08/15/21 1300)  Pulse: 96 (08/15/21 1300)  Temp Source: Oral (08/15/21 1158)  Respirations: (!) 30 (08/15/21 1300)  Height: 5' 9" (175 3 cm) (08/15/21 1158)  Weight - Scale: 58 1 kg (128 lb 1 4 oz) (08/15/21 1158)  SpO2: 98 % (08/15/21 1300)    Physical Exam  Constitutional:       Appearance: Normal appearance  He is normal weight  HENT:      Head: Normocephalic and atraumatic  Nose: Nose normal       Mouth/Throat:      Mouth: Mucous membranes are moist       Pharynx: Oropharynx is clear  Comments: Poor dentition  Eyes:      Pupils: Pupils are equal, round, and reactive to light  Cardiovascular:      Rate and Rhythm: Regular rhythm  Tachycardia present  Pulses: Normal pulses  Heart sounds: Normal heart sounds  Pulmonary:      Effort: Respiratory distress (mild) present  Breath sounds: Wheezing present  Abdominal:      General: Bowel sounds are normal       Palpations: Abdomen is soft  Tenderness: There is no abdominal tenderness  Musculoskeletal:         General: No swelling or tenderness  Normal range of motion  Cervical back: Normal range of motion and neck supple  Right lower leg: No edema  Left lower leg: No edema  Skin:     General: Skin is warm and dry  Neurological:      Mental Status: He is alert and oriented to person, place, and time     Psychiatric:         Mood and Affect: Mood normal          Behavior: Behavior normal           Additional Data:     Lab Results:  Results from last 7 days   Lab Units 08/15/21  1230   WBC Thousand/uL 14 31*   HEMOGLOBIN g/dL 14 5   HEMATOCRIT % 44 6   PLATELETS Thousands/uL 251   NEUTROS PCT % 91*   LYMPHS PCT % 4*   MONOS PCT % 4   EOS PCT % 0     Results from last 7 days   Lab Units 08/15/21  1230   SODIUM mmol/L 145   POTASSIUM mmol/L 3 6   CHLORIDE mmol/L 107   CO2 mmol/L 26   BUN mg/dL 35*   CREATININE mg/dL 1 24   ANION GAP mmol/L 12   CALCIUM mg/dL 7 6*   ALBUMIN g/dL 1 8*   TOTAL BILIRUBIN mg/dL 0 81   ALK PHOS U/L 353*   ALT U/L 84*   AST U/L 80*   GLUCOSE RANDOM mg/dL 130     Results from last 7 days   Lab Units 08/15/21  1230   INR  1 57*                   Imaging: Reviewed radiology reports from this admission including: chest xray and chest CT scan and Personally reviewed the following imaging: chest xray and chest CT scan  PE Study with CT Abdomen and Pelvis with contrast   Final Result by Poppy Richter MD (08/15 3816)      Compared to 10 days ago, new bilateral lung airspace disease most compatible with infection, pattern can be seen with Covid 19 pneumonia  No pulmonary embolism identified  New small areas of density in the thoracicoabdominal aorta resembling atherosclerotic plaque, questioned progressed atherosclerotic disease versus adherent emboli  No significant stenosis and no dissection  Other findings similar to 10 days ago, including diffuse bone metastases with evidence of pathologic left ischium fracture, described above  Workstation performed: [id number]         XR chest 1 view portable   ED Interpretation by Sapna Starks MD (08/15 1230)   Peripheral infiltrates      Final Result by Jr Thompson MD (08/15 1307)      Patchy bilateral groundglass opacity, question Covid 19 pneumonia  Redemonstration of left rib metastases  Workstation performed: [id number]             EKG and Other Studies Reviewed on Admission:   · EKG: Sinus Tachycardia    ** Please Note: This note has been constructed using a voice recognition system   **

## 2021-08-15 NOTE — RESPIRATORY THERAPY NOTE
08/15/21 1924   Respiratory Assessment   Assessment Type Assess only   General Appearance Alert; Awake   Respiratory Pattern Labored; Tachypneic;Dyspnea at rest;Dyspnea with exertion   Chest Assessment Chest expansion symmetrical   Bilateral Breath Sounds Diminished   Resp Comments Patient received in ICU and placed on HFNC with current settings of 50L/70% will titrate as tolerated   O2 Device Airvo   Non-Invasive Information   O2 Interface Device HFNC prongs   Non-Invasive Ventilation Mode HFNC (High flow)   SpO2 93 %   $ Pulse Oximetry Spot Check Charge Completed   Non-Invasive Settings   FiO2 (%) 70   Flow (lpm) 50   Temperature (Set) 34   Non-Invasive Readings   Heater Temperature (Obs) 34   Skin Intervention Skin intact   Maintenance   Water bag changed Yes

## 2021-08-16 NOTE — PHYSICAL THERAPY NOTE
Physical Therapy Evaluation     Patient's Name: Anneliese Wilcox    Admitting Diagnosis  Shortness of breath [R06 02]  Pneumonia [J18 9]  Acute respiratory failure with hypoxia ([facility] ) [J96 01]  COVID-19 [U07 1]    Problem List  Patient Active Problem List   Diagnosis    Elevated PSA, greater than or equal to 20 ng/ml    Abnormal CT scan, lumbar spine    Alkaline phosphatase elevation    Acute hypoxemic respiratory failure due to COVID-19 ([facility] )    Severe sepsis ([place] [address] )    Complaint of melena    Essential hypertension    Metastatic disease ([place] [address] ), 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 primary    May-Thurner syndrome    Severe protein-calorie malnutrition ([place] [address] )     Past Medical History  Past Medical History:   Diagnosis Date    Hypertension      Past Surgical History  Past Surgical History:   Procedure Laterality Date    PROSTATE BIOPSY  08/13/2021    Dr Alphonse Rosa      08/16/21 0954   PT Last Visit   PT Visit Date 08/16/21   Note Type   Note type Evaluation   Pain Assessment   Pain Assessment Tool 0-10   Pain Score No Pain   Home Living   Type of Home House   Home Layout Two level;Bed/bath upstairs;Stairs to enter with rails  (8 ADEN; FOS to 2nd floor)   Bathroom Shower/Tub Walk-in shower   Bathroom Toilet Standard   Bathroom Equipment Other (Comment)  (none per patient)   [address] Other (Comment)  (none per patient)   Additional Comments Pt ambulates without an AD  Prior Function   Level of [place] Independent with ADLs and functional mobility   Lives With Spouse   Receives Help From Family   ADL Assistance Independent   IADLs Independent   Falls in the last 6 months 0   Restrictions/Precautions   Weight Bearing Precautions Per Order No   Braces or Orthoses Other (Comment)  (none per patient)   Other Precautions Contact/isolation; Airborne/isolation;Droplet precautions; Chair Alarm; Bed Alarm;Multiple lines;Telemetry;O2;Fall Risk  (+covid, +HFNC O2)   General   Additional Pertinent History CT Abdomen and Pelvis on 8/15/21: OSSEOUS STRUCTURES:  Similar, diffuse osseous metastases, including expansile rib mass with soft tissue component left lower chest wall, up to 10 0 cm and large left ischial mass with evidence of nondisplaced pathologic fracture  Per discussion with Dr Lakshmi Morejon: pt with no weight bearing restrictions, can be weight bearing as tolerated   Family/Caregiver Present No   Cognition   Overall Cognitive Status WFL   Arousal/Participation Alert   Orientation Level Oriented X4   Memory Within functional limits   Following Commands Follows all commands and directions without difficulty   Comments Pt agreeable to PT    RLE Assessment   RLE Assessment X   Strength RLE   RLE Overall Strength 3+/5  (at least; grossly assessed with functional mobility)   LLE Assessment   LLE Assessment X   Strength LLE   LLE Overall Strength 3+/5  (at least; grossly assessed with functional mobility)   Light Touch   RLE Light Touch Grossly intact   LLE Light Touch Grossly intact   Bed Mobility   Sit to Supine 4  Minimal assistance   Additional items Assist x 1;HOB elevated; Bedrails; Increased time required;Verbal cues;LE management   Transfers   Sit to Stand 4  Minimal assistance   Additional items Assist x 1; Armrests; Increased time required;Verbal cues   Stand to Sit 4  Minimal assistance   Additional items Assist x 1; Armrests; Increased time required;Verbal cues   Ambulation/Elevation   Gait pattern Excessively slow; Step to;Short stride   Gait Assistance 4  Minimal assist   Additional items Assist x 1;Verbal cues   Assistive Device None   Distance 2 feet, chair to bed   Stair Management Assistance Not tested   Balance   Static Sitting Good   Dynamic Sitting Fair +   Static Standing Fair -   Dynamic Standing Poor +   Ambulatory Poor +   Endurance Deficit   Endurance Deficit Yes   Activity Tolerance   Activity Tolerance Patient limited by fatigue   Medical Staff Made Aware Discussed case with Dr Lakshmi Morejon Nurse Made Aware Discussed case with JOSLYN Edmond; post session pt was left seated in recliner in NAD, all belongings within reach   Assessment   Prognosis Good   Problem List Decreased strength;Decreased endurance; Impaired balance;Decreased mobility   Assessment Pt is 77year old male seen for PT evaluation s/p admit to [facility] on 8/15/2021 with Acute hypoxemic respiratory failure due to COVID-19 [facility])  PT consulted to assess pt's functional mobility and d/c needs  Order placed for PT eval and tx, with activity as tolerated and ambulate patient orders  Comorbidities affecting pt's physical performance at time of assessment include alkaline phosphatase elevation, severe sepsis, complaint of melena, essential hypertension, metastatic disease, 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 primary, May-Thurner syndrome, and severe protein calorie malnutrition  PTA, pt was independent with all functional mobility without an AD and ambulates community distances and elevations  Personal factors affecting pt at time of IE include lives in a two story house, stairs to enter home, inability to ambulate household distances, inability to navigate community distances, inability to navigate level surfaces without external assistance, unable to perform dynamic tasks in community, inability to perform IADLs, and inability to perform ADLs  Please find objective findings from PT assessment regarding body systems outlined above with impairments and limitations including weakness, impaired balance, decreased endurance, gait deviations, decreased activity tolerance, decreased functional mobility tolerance, fall risk, and SOB upon exertion  The following objective measures performed on IE also reveal limitations: Barthel Index: 55/100 and Modified [place]: 4 (moderate/severe disability)   Pt's clinical presentation is currently unstable/unpredictable seen in pt's presentation of need for ongoing medical management/monitoring, pt is a fall risk, pt has SOB with functional mobility, and pt requires cues/assist for safety with functional mobility  Pt to benefit from continued PT tx to address deficits as defined above and maximize level of functional independent mobility and consistency  From PT/mobility standpoint, recommendation at time of d/c would be Home PT with family support pending progress in order to facilitate return to PLOF  Barriers to Discharge Inaccessible home environment   Goals   Patient Goals to return home   STG Expiration Date 08/26/21   Short Term Goal #1 In 7-10 days: Increase bilateral LE strength 1/2 grade to facilitate independent mobility, Perform all bed mobility tasks modified independent to decrease caregiver burden, Perform all transfers modified independent to improve independence, Ambulate > 150 ft  with least restrictive assistive device modified independent w/o LOB and w/ normalized gait pattern 100% of the time, Navigate 12 stairs modified independent with unilateral handrail to facilitate return to previous living environment and Increase all balance 1/2 grade to decrease risk for falls   Plan   Treatment/Interventions Functional transfer training;LE strengthening/ROM; Elevations; Therapeutic exercise; Endurance training;Patient/family training;Bed mobility;Gait training;Spoke to nursing;Spoke to MD   PT Frequency Other (Comment)  (3-5x/wk)   Recommendation   PT Discharge Recommendation Home with home health rehabilitation   PT - OK to Discharge No   AM-PAC Basic Mobility Inpatient   Turning in Bed Without Bedrails 3   Lying on Back to Sitting on Edge of Flat Bed 3   Moving Bed to Chair 3   Standing Up From Chair 3   Walk in Room 3   Climb 3-5 Stairs 3   Basic Mobility Inpatient Raw Score 18   Basic Mobility Standardized Score 41 05   Modified McKittrick Scale   Modified McKittrick Scale 4   Barthel Index   Feeding 10   Bathing 0   Grooming Score 5   Dressing Score 5   Bladder Score 10   Bowels Score 10   Toilet Use Score 5   Transfers (Bed/Chair) Score 10   Mobility (Level Surface) Score 0   Stairs Score 0   Barthel Index Score 55     Kirti Gomez, PT, DPT

## 2021-08-16 NOTE — UTILIZATION REVIEW
Initial Clinical Review    Admission: Date/Time/Statement:   Admission Orders (From admission, onward)     Ordered        08/15/21 1354  Inpatient Admission  Once                   Orders Placed This Encounter   Procedures    Inpatient Admission     Standing Status:   Standing     Number of Occurrences:   1     Order Specific Question:   Level of Care     Answer:   Med Surg [16]     Order Specific Question:   Estimated length of stay     Answer:   More than 2 Midnights     Order Specific Question:   Certification     Answer:   I certify that inpatient services are medically necessary for this patient for a duration of greater than two midnights  See H&P and MD Progress Notes for additional information about the patient's course of treatment  ED Arrival Information     Expected Arrival Acuity    - 8/15/2021 11:55 Emergent         Means of arrival Escorted by Service Admission type    Ambulance [address] Emergency         Arrival complaint    Respiratory Distress        Chief Complaint   Patient presents with    Shortness of Breath     pt c/o cough, sob and weakness for the past few weeks, per ems pts O2 sat was 74% on RA       Initial Presentation: 78 yo male to ED from home w/ cough , SOB and weakness for the last 2 weeks   EMS found w/ O2 sat 74 % RA placed on 6l   Tested + for COVID   Admitted IP status to ICU for COVID pneumonia , supplemental O2  IV abx , f/u BC , start remdesivir , self  proning , f/u inflammatory markers  Melena cont to monitor cbc   Date: 8/16   Day 2: out of window for convalescent plasma , cont abx , monitor inflammatory markers   remdesivir stopped  Cont steroids     DC abx id PCT is neg x2   Cont lipitor        ED Triage Vitals   Temperature Pulse Respirations Blood Pressure SpO2   08/15/21 1831 08/15/21 1158 08/15/21 1158 08/15/21 1158 08/15/21 1158   98 3 °F (36 8 °C) (!) 110 (!) 26 119/71 (!) 85 %      Temp Source Heart Rate Source Patient Position - Orthostatic VS BP Location FiO2 (%)   08/15/21 1158 08/15/21 1158 08/15/21 1158 08/15/21 1158 --   Oral Monitor Sitting Right arm       Pain Score       08/15/21 2043       No Pain          Wt Readings from Last 1 Encounters:   08/15/21 58 1 kg (128 lb 1 4 oz)     Additional Vital Signs:   08/16/21 1101  98 4 °F (36 9 °C)  90  24Abnormal   126/67  91  100 %  --  --  High flow nasal cannula  HFNC prongs  Lying   08/16/21 0800  --  93  --  134/71  --  --  --  --  --  --  --   08/16/21 0745  --  --  --  --  --  95 %  --  --  --  HFNC prongs  --   08/16/21 0700  97 2 °F (36 2 °C)Abnormal   85  22  141/74  101  91 %  --  --  High flow nasal cannula  --  Lying   08/16/21 0648  98 °F (36 7 °C)  97  --  127/74  95  93 %  --  --  High flow nasal cannula  --  Lying   08/16/21 0600  --  92  27Abnormal   127/74  95  94 %  --  --  --  --  --   08/16/21 0500  --  96  28Abnormal   123/73  93  96 %  --  --  --  --  --   08/16/21 0426  --  --  --  --  --  --  --  --  --  HFNC prongs  --   08/16/21 0400  97 3 °F (36 3 °C)Abnormal   91  26Abnormal   115/67  86  98 %  --  --  --  --  --   08/16/21 0300  --  88  28Abnormal   132/73  96  97 %  --  --  --  --  --   08/16/21 0200  --  90  28Abnormal   103/67  81  98 %  --  --  --  --  --   08/16/21 0100  --  83  29Abnormal   112/68  86  98 %  --  --  --  --  --   08/16/21 0013  --  --  --  --  --  --  --  --  --  HFNC prongs  --   08/16/21 0000  --  83  37Abnormal   111/74  86  94 %  --  --  --  --  --   08/15/21 2300  --  96  27Abnormal   116/65  85  96 %  --  --  --  --  --   08/15/21 2200  --  85  28Abnormal   99/65  77  92 %  --  --  --  --  Lying   08/15/21 2139  --  --  --  --  --  --  --  --  --  HFNC prongs  --   08/15/21 2100  97 6 °F (36 4 °C)  83  26Abnormal   99/59  74  90 %  --  --  --  --  --   08/15/21 2000  --  86  22  97/56  71  91 %  --  --  --  --  --   08/15/21 1924  --  --  --  --  --  93 %  --  --  --  HFNC prongs  --   08/15/21 1834  98 3 °F (36 8 °C)  --  --  -- --  --  --  --  --  --  --   08/15/21 1831  98 3 °F (36 8 °C)  106Abnormal   25Abnormal   102/58  --  91 %  44  6 L/min  Nasal cannula  --  Lying   08/15/21 1715  --  95  36Abnormal   156/62  92  90 %  44  6 L/min  Nasal cannula  --  Sitting   08/15/21 1552  --  102  25Abnormal   116/69  --  90 %  44  6 L/min  Nasal cannula  --  Sitting   08/15/21 1300  --  96  30Abnormal   120/68  88  98 %  44  6 L/min  Nasal cannula  --  --   08/15/21 1257  --  --  --  --  --  --  --  --  Nasal cannula  --  --   08/15/21 1230  --  107Abnormal   36Abnormal   109/72  86  92 %  44               Pertinent Labs/Diagnostic Test Results:   8/16 Venous duplex nilesh - pending   8/15 CT abd Compared to 10 days ago, new bilateral lung airspace disease most compatible with infection, pattern can be seen with Covid 19 pneumonia    No pulmonary embolism identified    New small areas of density in the thoracicoabdominal aorta resembling atherosclerotic plaque, questioned progressed atherosclerotic disease versus adherent emboli  No significant stenosis and no dissection    8/15 PCXR Patchy bilateral groundglass opacity, question Covid 19 pneumonia    Results from last 7 days   Lab Units 08/15/21  1232   SARS-COV-2  Positive*     Results from last 7 days   Lab Units 08/16/21  1143 08/16/21  0443 08/15/21  1230   WBC Thousand/uL 11 37* 10 95* 14 31*   HEMOGLOBIN g/dL 11 7* 12 2 14 5   HEMATOCRIT % 36 4* 38 1 44 6   PLATELETS Thousands/uL 228 232 251   NEUTROS ABS Thousands/µL  --   --  12 96*     Results from last 7 days   Lab Units 08/16/21  0443 08/15/21  1230   SODIUM mmol/L 145 145   POTASSIUM mmol/L 3 8 3 6   CHLORIDE mmol/L 112* 107   CO2 mmol/L 23 26   ANION GAP mmol/L 10 12   BUN mg/dL 32* 35*   CREATININE mg/dL 0 91 1 24   EGFR ml/min/1 73sq m 87 60   CALCIUM mg/dL 7 1* 7 6*   MAGNESIUM mg/dL 2 4  --    PHOSPHORUS mg/dL 3 1  --      Results from last 7 days   Lab Units 08/16/21  0443 08/15/21  1230   AST U/L 123* 80*   ALT U/L 101* 84* ALK PHOS U/L 328* 353*   TOTAL PROTEIN g/dL 6 0* 7 1   ALBUMIN g/dL 1 5* 1 8*   TOTAL BILIRUBIN mg/dL 0 58 0 81   BILIRUBIN DIRECT mg/dL  --  0 38*     Results from last 7 days   Lab Units 08/16/21  0443 08/15/21  1230   GLUCOSE RANDOM mg/dL 124 130     Results from last 7 days   Lab Units 08/15/21  1230   TROPONIN I ng/mL <0 02     Results from last 7 days   Lab Units 08/16/21  0443 08/15/21  1230   D-DIMER QUANTITATIVE ug/ml FEU >20 00* >20 00*     Results from last 7 days   Lab Units 08/16/21  0443 08/15/21  1230   PROTIME seconds 17 2* 18 0*   INR  1 48* 1 57*   PTT seconds  --  27     Results from last 7 days   Lab Units 08/16/21  0443 08/15/21  1704   PROCALCITONIN ng/ml 0 76* 1 03*       Results from last 7 days   Lab Units 08/15/21  1230   NT-PRO BNP pg/mL 154*       Results from last 7 days   Lab Units 08/16/21  0443 08/15/21  1230   CRP mg/L 162 9* 226 9*     Results from last 7 days   Lab Units 08/15/21  1232 08/15/21  1231   BLOOD CULTURE  Received in Microbiology Lab  Culture in Progress  Received in Microbiology Lab  Culture in Progress       Results from last 7 days   Lab Units 08/16/21  0443   TOTAL COUNTED  100           ED Treatment:   Medication Administration from 08/15/2021 1155 to 08/15/2021 1917       Date/Time Order Dose Route Action     08/15/2021 1232 sodium chloride 0 9 % bolus 1,000 mL 1,000 mL Intravenous New Bag     08/15/2021 1246 ceftriaxone (ROCEPHIN) 1 g/50 mL in dextrose IVPB 1,000 mg Intravenous New Bag     08/15/2021 1433 enoxaparin (LOVENOX) subcutaneous injection 60 mg 60 mg Subcutaneous Given     08/15/2021 1707 dexamethasone (DECADRON) injection 6 mg 6 mg Intravenous Given     08/15/2021 1708 atorvastatin (LIPITOR) tablet 40 mg 40 mg Oral Given     08/15/2021 1708 remdesivir (Veklury) 200 mg in sodium chloride 0 9 % 250 mL IVPB 200 mg Intravenous New Bag     08/15/2021 1713 azithromycin (ZITHROMAX) 500 mg in sodium chloride 0 9% 250mL IVPB 500 mg 500 mg Intravenous New Bag Past Medical History:   Diagnosis Date    Hypertension      Present on Admission:   Acute hypoxemic respiratory failure due to COVID-19 (HCC)   Severe sepsis (HCC)   Complaint of melena   Essential hypertension   Metastatic disease ([facility] [address] ), 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 primary   May-Thurner syndrome   Severe protein-calorie malnutrition (HCC)   Alkaline phosphatase elevation      Admitting Diagnosis: Shortness of breath [R06 02]  Pneumonia [J18 9]  Acute respiratory failure with hypoxia (HCC) [J96 01]  COVID-19 [U07 1]  Age/Sex: 77 y o  male  Admission Orders:  Scheduled Medications:  atenolol, 25 mg, Oral, Daily  atorvastatin, 40 mg, Oral, Daily With Dinner  cefTRIAXone, 1,000 mg, Intravenous, Q24H  dexamethasone, 0 1 mg/kg, Intravenous, Q12H  doxycycline hyclate, 100 mg, Oral, Q12H TIARA  pantoprazole, 20 mg, Oral, Early Morning      Continuous IV Infusions:  heparin (porcine), 3-20 Units/kg/hr (Order-Specific), Intravenous, Titrated  sodium chloride, 75 mL/hr, Intravenous, Continuous    PRN Meds:  acetaminophen, 650 mg, Oral, Q6H PRN  heparin (porcine), 1,650 Units, Intravenous, Q1H PRN  heparin (porcine), 3,300 Units, Intravenous, Q1H PRN  ondansetron, 4 mg, Intravenous, Q6H PRN  polyethylene glycol, 17 g, Oral, Daily PRN    Keep O2 sat >90 %   Tele   Self proning     IP CONSULT TO PULMONOLOGY    Network Utilization Review Department  ATTENTION: Please call with any questions or concerns to [phone] and carefully listen to the prompts so that you are directed to the right person  All voicemails are confidential   Ailyn Query all requests for admission clinical reviews, approved or denied determinations and any other requests to dedicated fax number below belonging to the campus where the patient is receiving treatment   List of dedicated fax numbers for the Facilities:  FACILITY NAME UR FAX NUMBER   ADMISSION DENIALS (Administrative/Medical Necessity) [phone]   [address] (Maternity/NICU/Pediatrics) [address] [facility],[address] [place] [facility] [address] [id number] [place] [address] Gaby Willard [facility] [address] [place] [address] [phone]

## 2021-08-16 NOTE — RESPIRATORY THERAPY NOTE
08/16/21 1101   Respiratory Assessment   Assessment Type Assess only   General Appearance Sleeping   Respiratory Pattern Labored; Tachypneic   Chest Assessment Chest expansion symmetrical   Bilateral Breath Sounds Diminished   Non-Invasive Information   O2 Interface Device HFNC prongs   Non-Invasive Ventilation Mode HFNC (High flow)   SpO2 100 %   $ Pulse Oximetry Spot Check Charge Completed   Non-Invasive Settings   FiO2 (%) 80   Flow (lpm) 50   Temperature (Set) 31   Non-Invasive Readings   Heater Temperature (Obs) 31   Skin Intervention Skin intact   Maintenance   Water bag changed No

## 2021-08-16 NOTE — ASSESSMENT & PLAN NOTE
Bilateral lower extremity duplexes pending  In the setting of malignancy and COVID-19, places him at significantly higher risk for thrombosis

## 2021-08-16 NOTE — ASSESSMENT & PLAN NOTE
Bilateral lower extremity duplexes pending  In the setting of malignancy and COVID-19, places him at significantly higher risk for thrombosis  May alter risk/benefit stratification if positive    Would re-approach patient if needed

## 2021-08-16 NOTE — RESPIRATORY THERAPY NOTE
08/16/21 0013   Respiratory Assessment   Resp Comments patient awake and alert in no apparent resp distress     Non-Invasive Information   O2 Interface Device HFNC prongs   Non-Invasive Ventilation Mode HFNC (High flow)   Non-Invasive Settings   FiO2 (%) 94   Flow (lpm) 60   Temperature (Set) 34   Non-Invasive Readings   Heater Temperature (Obs) 34   Skin Intervention Skin intact

## 2021-08-16 NOTE — QUICK NOTE
QUICK NOTE - Deterioration Index  Justyna Graves 77 y o  male MRN: [id number]  Unit/Bed#:  Encounter: [id number]      Time Paged: [address]  Room #: ER29  Primary RN:   Arrival Time: 4210  Deterioration index score at time of page: 62    PROBLEMS:    COVID 19   Acute hypoxemic respiratory failure    PLAN:     Pt originally being brought to ICU as medsurg overflow on 6L NC with sats 90%  Pt evaluated prior to transport, when he did not arrive as expected  Transferred to [facility] due to increasing oxygen requirements (HFNC 70% at 50L)  Chloe Terry HPI Statement (Background):   Justyna Graves is a 77y o  year old male who presents with weakness and SOB over past 2 weeks  Admitted as MS patient, but oxygen requirements increased substantially  Transferred to [facility] level of care with high flow      Historical Information   Past Medical History:   Diagnosis Date    Hypertension      Past Surgical History:   Procedure Laterality Date    PROSTATE BIOPSY  2021    Dr Doug Garcia:   Vitals:    08/15/21 1924 08/15/21 2000 08/15/21 2100 08/15/21 2200   BP:  97/56 99/59 99/65   BP Location:    Left arm   Pulse:  86 83 85   Resp:  22 (!) 26 (!) 28   Temp:   97 6 °F (36 4 °C)    TempSrc:   Axillary    SpO2: 93% 91% 90% 92%   Weight:       Height:           Temperature: Temp (24hrs), Av 1 °F (36 7 °C), Min:97 6 °F (36 4 °C), Max:98 3 °F (36 8 °C)  Current: Temperature: 97 6 °F (36 4 °C)    DIAGNOSTIC DATA:    Labs:   Results from last 7 days   Lab Units 08/15/21  1230   WBC Thousand/uL 14 31*   HEMOGLOBIN g/dL 14 5   HEMATOCRIT % 44 6   PLATELETS Thousands/uL 251   NEUTROS PCT % 91*   MONOS PCT % 4     Results from last 7 days   Lab Units 08/15/21  1230   SODIUM mmol/L 145   POTASSIUM mmol/L 3 6   CHLORIDE mmol/L 107   CO2 mmol/L 26   BUN mg/dL 35*   CREATININE mg/dL 1 24   CALCIUM mg/dL 7 6*   ALK PHOS U/L 353*   ALT U/L 84*   AST U/L 80*              Results from last 7 days   Lab Units 08/15/21  1230   INR  1 57* PTT seconds 27         Results from last 7 days   Lab Units 08/15/21  1230   TROPONIN I ng/mL <0 02             No results found for: [facility]             Applicable Imaging Studies:     Code Status: Level 3 - DNAR and DNI

## 2021-08-16 NOTE — PLAN OF CARE
Problem: PHYSICAL THERAPY ADULT  Goal: Performs mobility at highest level of function for planned discharge setting  See evaluation for individualized goals  Description: Treatment/Interventions: Functional transfer training, LE strengthening/ROM, Elevations, Therapeutic exercise, Endurance training, Patient/family training, Bed mobility, Gait training, Spoke to nursing, Spoke to MD          See flowsheet documentation for full assessment, interventions and recommendations  Note: Prognosis: Good  Problem List: Decreased strength, Decreased endurance, Impaired balance, Decreased mobility  Assessment: Pt is 77year old male seen for PT evaluation s/p admit to [facility] on 8/15/2021 with Acute hypoxemic respiratory failure due to COVID-19 [facility])  PT consulted to assess pt's functional mobility and d/c needs  Order placed for PT eval and tx, with activity as tolerated and ambulate patient orders  Comorbidities affecting pt's physical performance at time of assessment include alkaline phosphatase elevation, severe sepsis, complaint of melena, essential hypertension, metastatic disease, 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 primary, May-Thurner syndrome, and severe protein calorie malnutrition  PTA, pt was independent with all functional mobility without an AD and ambulates community distances and elevations  Personal factors affecting pt at time of IE include lives in a two story house, stairs to enter home, inability to ambulate household distances, inability to navigate community distances, inability to navigate level surfaces without external assistance, unable to perform dynamic tasks in community, inability to perform IADLs, and inability to perform ADLs   Please find objective findings from PT assessment regarding body systems outlined above with impairments and limitations including weakness, impaired balance, decreased endurance, gait deviations, decreased activity tolerance, decreased functional mobility tolerance, fall risk, and SOB upon exertion  The following objective measures performed on IE also reveal limitations: Barthel Index: 55/100 and Modified [place]: 4 (moderate/severe disability)  Pt's clinical presentation is currently unstable/unpredictable seen in pt's presentation of need for ongoing medical management/monitoring, pt is a fall risk, pt has SOB with functional mobility, and pt requires cues/assist for safety with functional mobility  Pt to benefit from continued PT tx to address deficits as defined above and maximize level of functional independent mobility and consistency  From PT/mobility standpoint, recommendation at time of d/c would be Home PT with family support pending progress in order to facilitate return to PLOF  Barriers to Discharge: Inaccessible home environment    PT Discharge Recommendation: Home with home health rehabilitation     PT - OK to Discharge: No    See flowsheet documentation for full assessment

## 2021-08-16 NOTE — PLAN OF CARE
Problem: Potential for Falls  Goal: Patient will remain free of falls  Description: INTERVENTIONS:  - Educate patient/family on patient safety including physical limitations  - Instruct patient to call for assistance with activity   - Consult OT/PT to assist with strengthening/mobility   - Keep Call bell within reach  - Keep bed low and locked with side rails adjusted as appropriate  - Keep care items and personal belongings within reach  - Initiate and maintain comfort rounds  - Make Fall Risk Sign visible to staff  - Offer Toileting every 2 Hours, in advance of need  - Initiate/Maintain bed/chair alarm  - Obtain necessary fall risk management equipment:   - Apply yellow socks and bracelet for high fall risk patients  - Consider moving patient to room near nurses station  Outcome: Progressing     Problem: PAIN - ADULT  Goal: Verbalizes/displays adequate comfort level or baseline comfort level  Description: Interventions:  - Encourage patient to monitor pain and request assistance  - Assess pain using appropriate pain scale  - Administer analgesics based on type and severity of pain and evaluate response  - Implement non-pharmacological measures as appropriate and evaluate response  - Consider cultural and social influences on pain and pain management  - Notify physician/advanced practitioner if interventions unsuccessful or patient reports new pain  Outcome: Progressing     Problem: INFECTION - ADULT  Goal: Absence or prevention of progression during hospitalization  Description: INTERVENTIONS:  - Assess and monitor for signs and symptoms of infection  - Monitor lab/diagnostic results  - Monitor all insertion sites, i e  indwelling lines, tubes, and drains  - Monitor endotracheal if appropriate and nasal secretions for changes in amount and color  - Apex appropriate cooling/warming therapies per order  - Administer medications as ordered  - Instruct and encourage patient and family to use good hand hygiene technique  - Identify and instruct in appropriate isolation precautions for identified infection/condition  Outcome: Progressing  Goal: Absence of fever/infection during neutropenic period  Description: INTERVENTIONS:  - Monitor WBC    Outcome: Progressing     Problem: SAFETY ADULT  Goal: Patient will remain free of falls  Description: INTERVENTIONS:  - Educate patient/family on patient safety including physical limitations  - Instruct patient to call for assistance with activity   - Consult OT/PT to assist with strengthening/mobility   - Keep Call bell within reach  - Keep bed low and locked with side rails adjusted as appropriate  - Keep care items and personal belongings within reach  - Initiate and maintain comfort rounds  - Make Fall Risk Sign visible to staff  - Offer Toileting every 2 Hours, in advance of need  - Initiate/Maintain Bed/chair alarm  - Obtain necessary fall risk management equipment:   - Apply yellow socks and bracelet for high fall risk patients  - Consider moving patient to room near nurses station  Outcome: Progressing  Goal: Maintain or return to baseline ADL function  Description: INTERVENTIONS:  -  Assess patient's ability to carry out ADLs; assess patient's baseline for ADL function and identify physical deficits which impact ability to perform ADLs (bathing, care of mouth/teeth, toileting, grooming, dressing, etc )  - Assess/evaluate cause of self-care deficits   - Assess range of motion  - Assess patient's mobility; develop plan if impaired  - Assess patient's need for assistive devices and provide as appropriate  - Encourage maximum independence but intervene and supervise when necessary  - Involve family in performance of ADLs  - Assess for home care needs following discharge   - Consider OT consult to assist with ADL evaluation and planning for discharge  - Provide patient education as appropriate  Outcome: Progressing  Goal: Maintains/Returns to pre admission functional level  Description: INTERVENTIONS:  - Perform BMAT or MOVE assessment daily    - Set and communicate daily mobility goal to care team and patient/family/caregiver  - Collaborate with rehabilitation services on mobility goals if consulted  - Perform Range of Motion 2 times a day  - Reposition patient every 2 hours  - Dangle patient 3 times a day  - Stand patient 3 times a day  - Ambulate patient 3 times a day  - Out of bed to chair 3 times a day   - Out of bed for meals 3 times a day  - Out of bed for toileting  - Record patient progress and toleration of activity level   Outcome: Progressing     Problem: DISCHARGE PLANNING  Goal: Discharge to home or other facility with appropriate resources  Description: INTERVENTIONS:  - Identify barriers to discharge w/patient and caregiver  - Arrange for needed discharge resources and transportation as appropriate  - Identify discharge learning needs (meds, wound care, etc )  - Arrange for interpretive services to assist at discharge as needed  - Refer to Case Management Department for coordinating discharge planning if the patient needs post-hospital services based on physician/advanced practitioner order or complex needs related to functional status, cognitive ability, or social support system  Outcome: Progressing     Problem: Knowledge Deficit  Goal: Patient/family/caregiver demonstrates understanding of disease process, treatment plan, medications, and discharge instructions  Description: Complete learning assessment and assess knowledge base    Interventions:  - Provide teaching at level of understanding  - Provide teaching via preferred learning methods  Outcome: Progressing     Problem: MOBILITY - ADULT  Goal: Maintain or return to baseline ADL function  Description: INTERVENTIONS:  -  Assess patient's ability to carry out ADLs; assess patient's baseline for ADL function and identify physical deficits which impact ability to perform ADLs (bathing, care of mouth/teeth, toileting, grooming, dressing, etc )  - Assess/evaluate cause of self-care deficits   - Assess range of motion  - Assess patient's mobility; develop plan if impaired  - Assess patient's need for assistive devices and provide as appropriate  - Encourage maximum independence but intervene and supervise when necessary  - Involve family in performance of ADLs  - Assess for home care needs following discharge   - Consider OT consult to assist with ADL evaluation and planning for discharge  - Provide patient education as appropriate  Outcome: Progressing  Goal: Maintains/Returns to pre admission functional level  Description: INTERVENTIONS:  - Perform BMAT or MOVE assessment daily    - Set and communicate daily mobility goal to care team and patient/family/caregiver  - Collaborate with rehabilitation services on mobility goals if consulted  - Perform Range of Motion 2 times a day  - Reposition patient every 2 hours    - Dangle patient 3 times a day  - Stand patient 3 times a day  - Ambulate patient 3 times a day  - Out of bed to chair 3 times a day   - Out of bed for meals 3 times a day  - Out of bed for toileting  - Record patient progress and toleration of activity level   Outcome: Progressing     Problem: Prexisting or High Potential for Compromised Skin Integrity  Goal: Skin integrity is maintained or improved  Description: INTERVENTIONS:  - Identify patients at risk for skin breakdown  - Assess and monitor skin integrity  - Assess and monitor nutrition and hydration status  - Monitor labs   - Assess for incontinence   - Turn and reposition patient  - Assist with mobility/ambulation  - Relieve pressure over bony prominences  - Avoid friction and shearing  - Provide appropriate hygiene as needed including keeping skin clean and dry  - Evaluate need for skin moisturizer/barrier cream  - Collaborate with interdisciplinary team   - Patient/family teaching  - Consider wound care consult   Outcome: Progressing

## 2021-08-16 NOTE — RESPIRATORY THERAPY NOTE
08/15/21 0557   Respiratory Assessment   Resp Comments Patient was desaturating to the low 80's and required an increase in his HFNC settings     Non-Invasive Information   O2 Interface Device HFNC prongs   Non-Invasive Ventilation Mode HFNC (High flow)   $ Pulse Oximetry Spot Check Charge Completed   Non-Invasive Settings   FiO2 (%) 94   Flow (lpm) 60   Temperature (Set) 34   Non-Invasive Readings   Heater Temperature (Obs) 34   Skin Intervention Skin intact

## 2021-08-16 NOTE — RESPIRATORY THERAPY NOTE
08/16/21 1949   Respiratory Assessment   Assessment Type Assess only   General Appearance Alert; Awake   Respiratory Pattern Labored; Tachypneic;Dyspnea with exertion;Dyspnea at rest   Chest Assessment Chest expansion symmetrical   Bilateral Breath Sounds Diminished   Resp Comments patient remains on HFNC at this time, pt is up in the chair and had taken his cannula out of his nose found at 84% oxygen placed back on and patient rebounded to 88 while eating  will continue with current settings      Non-Invasive Information   O2 Interface Device HFNC prongs   Non-Invasive Ventilation Mode HFNC (High flow)   SpO2 (!) 88 %   $ Pulse Oximetry Spot Check Charge Completed   Non-Invasive Settings   FiO2 (%) 65   Flow (lpm) 60   Temperature (Set) 31   Non-Invasive Readings   Heater Temperature (Obs) 31   Skin Intervention Skin intact   Maintenance   Water bag changed Yes

## 2021-08-16 NOTE — ASSESSMENT & PLAN NOTE
Upgraded to severe pathway upon evaluation  Requiring high-flow nasal cannula to maintain saturations greater than 90%  Code status DNR DNI per patient  COVID Treatment (Severe Pathway):  - Monitor inflammatory markers   - Antibiotics (may be d/c'd if procal normal X2)  - Lipitor 40 mg daily  - Steroids:  dexamethasone 0 1 mg/kg q 12 hours times 10 days  - Out of window for Convalescent Plasma  - May consider Actemra, patient with high suspicion for extensively metastatic prostate cancer  - Anticoagulation:  On hold due to recent prostate biopsy and melanotic stools, discussed risk versus benefit with patient and he politely declines  - Self proning as tolerated  - Remdesivir discontinued as now is on severe pathway and is not indicated

## 2021-08-16 NOTE — PROGRESS NOTES
[facility]  Progress Note Jyothi Foster 1955, 77 y o  male MRN: [id number]  Unit/Bed#:  Encounter: [id number]  Primary Care Provider: Sahra Gomez MD   Date and time admitted to hospital: 8/15/2021 11:55 AM    Severe protein-calorie malnutrition ([facility] [address] )  Assessment & Plan  Malnutrition Findings:     albumin less than 2, prominent clavicles       BMI Findings: Body mass index is 18 92 kg/m²  Nutrition consulted    May-Thurner syndrome  Assessment & Plan  Bilateral lower extremity duplexes pending  In the setting of malignancy and COVID-19, places him at significantly higher risk for thrombosis  May alter risk/benefit stratification if positive  Would re-approach patient if needed      Metastatic disease [facility]), 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 primary  Assessment & Plan  Patient with imaging consistent with bony metastasis involving the ribs and pelvis, with likely prostate primary    Underwent biopsy on 08/13, awaiting pathology  Following Urology as outpatient  Planned chemotherapeutic intervention likely to be delayed secondary to COVID-19 pneumonia    Essential hypertension  Assessment & Plan  Continue atenolol at home dosing    Complaint of melena  Assessment & Plan  Patient was told to expect some bleeding for the next 2 weeks secondary to prostatic biopsy performed on 08/13  INR slightly elevated on admission, hemoglobin stable  Received 1 dose of full-dose Lovenox, held after due to concern for bleeding  Continue to monitor  Holding anticoagulation for COVID-19 after discussion with patient    Severe sepsis ([facility] [address] )  Assessment & Plan  Secondary to COVID-19 pneumonia  See plan under COVID-19    Alkaline phosphatase elevation  Assessment & Plan  Likely related to bony metastasis  Judicious fluids  Continue to monitor    * Acute hypoxemic respiratory failure due to COVID-19 [facility])  Assessment & Plan  Upgraded to severe pathway upon evaluation  Requiring high-flow nasal cannula to maintain saturations greater than 90%-placed on maximum settings overnight due to desaturations  Code status DNR DNI per patient  COVID Treatment (Severe Pathway):  - Monitor inflammatory markers  D-dimer and CRP markedly elevated, however also in the setting of metastatic disease    - Antibiotics (may be d/c'd if procal normal X2)  - Lipitor 40 mg daily  - Steroids:  dexamethasone 0 1 mg/kg q 12 hours times 10 days  - Out of window for Convalescent Plasma  - May consider Actemra, although patient with high suspicion for extensively metastatic prostate cancer  - Anticoagulation:  On hold due to recent prostate biopsy and melanotic stools, discussed risk versus benefit with patient and he politely declines  - Self proning as tolerated  - Remdesivir discontinued as now is on severe pathway and is not indicated        ----------------------------------------------------------------------------------------  HPI/24hr events:  Admitted last evening with acute hypoxemic respiratory failure secondary to COVID-19 pneumonia (unvaccinated)  Goals of care discussion yields DNR DNI decision by the patient, with transition to comfort if necessary  Multiple desaturation episodes, slow to recruit  Remains tachypneic  Emotional support provided        Disposition: Continue Stepdown Level 1 level of care   Code Status: Level 3 - DNAR and DNI  ---------------------------------------------------------------------------------------  SUBJECTIVE  No complaints offered    Review of Systems  Review of systems was reviewed and negative unless stated above in HPI/24-hour events   ---------------------------------------------------------------------------------------  OBJECTIVE    Vitals   Vitals:    08/16/21 0200 08/16/21 0300 08/16/21 0400 08/16/21 0500   BP: 103/67 132/73 115/67 123/73   Pulse: 90 88 91 96   Resp: (!) 28 (!) 28 (!) 26 (!) 28   Temp:   (!) 97 3 °F (36 3 °C)    TempSrc:   Axillary    SpO2: 98% 97% 98% 96%   Weight: Height:         Temp (24hrs), Av 9 °F (36 6 °C), Min:97 3 °F (36 3 °C), Max:98 3 °F (36 8 °C)  Current: Temperature: (!) 97 3 °F (36 3 °C)          Respiratory:  SpO2: SpO2: 96 %  Nasal Cannula O2 Flow Rate (L/min): 6 L/min    Invasive/non-invasive ventilation settings   Respiratory    Lab Data (Last 4 hours)    None         O2/Vent Data (Last 4 hours)      426          Non-Invasive Ventilation Mode HFNC (High flow)                   Physical Exam  GEN:  Ill appearing, appears older than stated age, no acute distress  HEENT:  Sclera anicteric, mucous membranes pink and moist, conjunctiva pink, no ezequiel/rhinorrhea  Neck:  No lymphadenopathy, normal ROM, supple, no bruits  CV :  S1S2, regular, no murmurs, rubs or gallops  Intact distal pulses  No JVD  Resp:  Lungs rhonchorous throughout  No subq air or crepitus  Symmetrical expansion  Non productive cough noted  Able to complete short sentences    No retractions noted  GI :  Abd soft, nontender, no guarding/rebound, nondistended, hypoactive bowel sounds X4 quads, no organomegaly appreciated  Neuro:  CN II-XII grossly intact, nonfocal exam, speech clear, GCS 15  Psych:  Appropriate affect    Laboratory and Diagnostics:  Results from last 7 days   Lab Units 08/16/21  0443 08/15/21  1230   WBC Thousand/uL 10 95* 14 31*   HEMOGLOBIN g/dL 12 2 14 5   HEMATOCRIT % 38 1 44 6   PLATELETS Thousands/uL 232 251   NEUTROS PCT %  --  91*   MONOS PCT %  --  4   MONO PCT % 4  --      Results from last 7 days   Lab Units 21  0443 08/15/21  1230   SODIUM mmol/L 145 145   POTASSIUM mmol/L 3 8 3 6   CHLORIDE mmol/L 112* 107   CO2 mmol/L 23 26   ANION GAP mmol/L 10 12   BUN mg/dL 32* 35*   CREATININE mg/dL 0 91 1 24   CALCIUM mg/dL 7 1* 7 6*   GLUCOSE RANDOM mg/dL 124 130   ALT U/L 101* 84*   AST U/L 123* 80*   ALK PHOS U/L 328* 353*   ALBUMIN g/dL 1 5* 1 8*   TOTAL BILIRUBIN mg/dL 0 58 0 81     Results from last 7 days   Lab Units 08/16/21  0443   MAGNESIUM mg/dL 2  4   PHOSPHORUS mg/dL 3 1      Results from last 7 days   Lab Units 08/16/21  0443 08/15/21  1230   INR  1 48* 1 57*   PTT seconds  --  27      Results from last 7 days   Lab Units 08/15/21  1230   TROPONIN I ng/mL <0 02         ABG:    VBG:    Results from last 7 days   Lab Units 08/15/21  1704   PROCALCITONIN ng/ml 1 03*       Micro  Results from last 7 days   Lab Units 08/15/21  1232 08/15/21  1231   BLOOD CULTURE  Received in Microbiology Lab  Culture in Progress  Received in Microbiology Lab  Culture in Progress  EKG:  Sinus rhythm  Imaging: I have personally reviewed pertinent reports  and I have personally reviewed pertinent films in PACS    Intake and Output  I/O       08/14 0701 - 08/15 0700 08/15 0701 - 08/16 0700    I V  (mL/kg)  228 8 (3 9)    IV Piggyback  2000    Total Intake(mL/kg)  2228 8 (38 4)    Urine (mL/kg/hr)  250    Total Output  250    Net  +1978 8                Height and Weights   Height: 5' 9" (175 3 cm)  IBW (Ideal Body Weight): 70 7 kg  Body mass index is 18 92 kg/m²  Weight (last 2 days)     Date/Time   Weight    08/15/21 1158   58 1 (128 09)                Nutrition       Diet Orders   (From admission, onward)             Start     Ordered    08/15/21 1611  Diet Regular; Regular House  Diet effective now     Question Answer Comment   Diet Type Regular    Regular Regular House    RD to adjust diet per protocol?  Yes        08/15/21 1610                  Active Medications  Scheduled Meds:  Current Facility-Administered Medications   Medication Dose Route Frequency Provider Last Rate    acetaminophen  650 mg Oral Q6H PRN [place] PUSHPA Levi      atenolol  25 mg Oral Daily [place] PUSHPA Levi      atorvastatin  40 mg Oral Daily With [employer]      cefTRIAXone  1,000 mg Intravenous Q24H Shanthi Zavala MD      dexamethasone  0 1 mg/kg Intravenous Q12H MEHUL Perez      doxycycline hyclate  100 mg Oral Q12H [address] Wei Mcintyre MEHUL      ondansetron  4 mg Intravenous Q6H PRN Jose Reyez PA-C      pantoprazole  20 mg Oral Early Morning Jose Reyez PA-C      polyethylene glycol  17 g Oral Daily PRN Jose Reyez PA-C      sodium chloride  75 mL/hr Intravenous Continuous MEHUL Garcia 75 mL/hr (08/15/21 2057)     Continuous Infusions:  sodium chloride, 75 mL/hr, Last Rate: 75 mL/hr (08/15/21 2057)      PRN Meds:   acetaminophen, 650 mg, Q6H PRN  ondansetron, 4 mg, Q6H PRN  polyethylene glycol, 17 g, Daily PRN        Invasive Devices Review  Invasive Devices     Peripheral Intravenous Line            Peripheral IV Distal;Right;Upper;Ventral (anterior) Arm -- days    Peripheral IV Left;Ventral (anterior) Forearm -- days                Rationale for remaining devices:  Nonapplicable  ---------------------------------------------------------------------------------------  Advance Directive and Living Will:      Power of :    POLST:    ---------------------------------------------------------------------------------------  Care Time Delivered:   No Critical Care time spent       MEHUL Garcia      Portions of the record may have been created with voice recognition software  Occasional wrong word or "sound a like" substitutions may have occurred due to the inherent limitations of voice recognition software    Read the chart carefully and recognize, using context, where substitutions have occurred

## 2021-08-16 NOTE — ASSESSMENT & PLAN NOTE
Malnutrition Findings:     albumin less than 2, prominent clavicles       BMI Findings: Body mass index is 18 92 kg/m²     Nutrition consulted

## 2021-08-16 NOTE — ASSESSMENT & PLAN NOTE
Patient with imaging consistent with bony metastasis involving the ribs and pelvis, with likely prostate primary    Underwent biopsy on 08/13, awaiting pathology  Following Urology as outpatient  Planned chemotherapeutic intervention likely to be delayed secondary to COVID-19 pneumonia

## 2021-08-16 NOTE — RESPIRATORY THERAPY NOTE
08/16/21 0745   Respiratory Assessment   Assessment Type Assess only   General Appearance Alert; Awake   Respiratory Pattern Assisted   Chest Assessment Chest expansion symmetrical   Bilateral Breath Sounds Diminished   Resp Comments pt remains on hfnc  no changes at this time   Non-Invasive Information   O2 Interface Device HFNC prongs   Non-Invasive Ventilation Mode HFNC (High flow)   SpO2 95 %   $ Pulse Oximetry Spot Check Charge Completed   Non-Invasive Settings   FiO2 (%) 100   Flow (lpm) 50   Temperature (Set) 30   Non-Invasive Readings   Heater Temperature (Obs) 30   Skin Intervention Skin intact   Maintenance   Water bag changed Yes

## 2021-08-16 NOTE — ASSESSMENT & PLAN NOTE
Patient was told to expect some bleeding for the next 2 weeks secondary to prostatic biopsy performed on 08/13  INR slightly elevated on admission, hemoglobin stable  Continue to monitor  Holding anticoagulation for COVID-19 after discussion with patient

## 2021-08-16 NOTE — RESPIRATORY THERAPY NOTE
Titrated to 65%     08/16/21 4448   Non-Invasive Information   O2 Interface Device HFNC prongs   Non-Invasive Ventilation Mode HFNC (High flow)   SpO2 98 %   $ Pulse Oximetry Spot Check Charge Completed   Non-Invasive Settings   FiO2 (%) 65   Flow (lpm) 50   Temperature (Set) 31   Non-Invasive Readings   Heater Temperature (Obs) 31   Skin Intervention Skin intact   Maintenance   Water bag changed No

## 2021-08-16 NOTE — UTILIZATION REVIEW
Inpatient Admission Authorization Request   NOTIFICATION OF INPATIENT ADMISSION/INPATIENT AUTHORIZATION REQUEST   SERVICING FACILITY:   [place] [address]  Tax ID: [id number]  NPI: [id number]  Place of [facility]: [facility]patient [facility]  [address]  Place of Service Code: 24     ATTENDING PROVIDER:  Attending Name and NPI#: Lillian Easley [[id number]]  Address: [address] [place] [address]  Phone: [phone]     UTILIZATION REVIEW CONTACT:  [facility] Utilization   Network Utilization Review Department  Phone: [phone]  Fax [phone]  Email: [email or web address]     PHYSICIAN ADVISORY SERVICES:  FOR TZCH-ED-YLLX REVIEW - MEDICAL NECESSITY DENIAL  Phone: [phone]  Fax: [phone]  Email: [email or web address]     TYPE OF REQUEST:  Inpatient Status     ADMISSION INFORMATION:  ADMISSION DATE/TIME: 8/15/21  1:54 PM  PATIENT DIAGNOSIS CODE/DESCRIPTION:  Shortness of breath [R06 02]  Pneumonia [J18 9]  Acute respiratory failure with hypoxia (HCC) [J96 01]  COVID-19 [U07 1]  DISCHARGE DATE/TIME: No discharge date for patient encounter  DISCHARGE DISPOSITION (IF DISCHARGED): Home/Self Care     IMPORTANT INFORMATION:  Please contact the [facility] directly with any questions or concerns regarding this request  Department voicemails are confidential     Send requests for admission clinical reviews, concurrent reviews, approvals, and administrative denials due to lack of clinical to fax [phone]

## 2021-08-16 NOTE — CASE MANAGEMENT
Case Management Assessment & Discharge Planning Note    Patient name Dao Yan  Location /ICU 36 MRN [id number]  : 1955 Date 2021       Current Admission Date: 8/15/2021  Current Admission Diagnosis:  Acute hypoxemic respiratory failure due to COVID-19 [facility])   Patient Active Problem List   Diagnosis    Elevated PSA, greater than or equal to 20 ng/ml    Abnormal CT scan, lumbar spine    Alkaline phosphatase elevation    Acute hypoxemic respiratory failure due to COVID-19 (HCC)    Severe sepsis (HCC)    Complaint of melena    Essential hypertension    Metastatic disease ([facility] [address] ), 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 primary    May-Thurner syndrome    Severe protein-calorie malnutrition ([facility] [address] )    Previous Admission - Discharge Date:21   LOS (days): 1  Geometric Mean LOS (GMLOS) (days):   Days to GMLOS: Previous Discharge Diagnosis:  There are no discharge diagnoses documented for the most recent discharge         Risk of Unplanned Readmission Score  Predictive Model Details          15 (Low)  Factor Value    Calculated 2021 08:04 13% Number of ED visits in last six months 2    Risk of Unplanned Readmission Model 11% ECG/EKG order present in last 6 months     11% Latest calcium low (7 1 mg/dL)     10% Number of active Rx orders 12     9% Latest BUN high (32 mg/dL)     9% Latest INR high (1 48)     8% Charlson Comorbidity Index 6     8% Imaging order present in last 6 months     7% Phosphorous result present     6% Age 77     5% Active corticosteroid Rx order present     2% Future appointment scheduled     1% Active ulcer medication Rx order present     1% Current length of stay 0 758 days         BUNDLE:      Reason for Referral:    OBJECTIVE:  Pt is a 77y o  year old /[employer], white or  [1], male with Yarsanism preference of None admitted on 8/15/2021 11:55 AM  Pt is admitted to  at [address] [place] [address] with complaints of Acute hypoxemic respiratory failure due to COVID-19 [facility])   Current admission status: Inpatient       Preferred Pharmacy:   [facility] [address] [facility] [address] [facility] [address] [address]  Phone: [phone] Fax: [phone]    Primary Care Provider: Kenny Carmona MD    Primary Insurance: Dora Race REP  Secondary Insurance:     ASSESSMENT:  Active Health Care Agents    There are no active Health Care Agents on file  Advance Directives  Does patient have a [address]?: No  Was patient offered paperwork?: Yes  Does patient currently have a Health Care decision maker?: Yes, please see Health Care Proxy section  Does patient have Advance Directives?: No  Was patient offered paperwork?: Yes (Pt states that he has a friend who is an  and will be working on this himself)         [facility] Residence: [facility]    Readmission Root Cause  30 Day Readmission: No    Patient Information  Mental Status: Alert  During Assessment patient was accompanied by: Not accompanied during assessment  Assessment information provided by[de-identified] Patient  Support Systems: Spouse/significant other  What city do you live in?: [address] [facility] entry access options   Select all that apply : Stairs  Number of steps to enter home : 8  Type of Current Residence: [address] [place] home  Upon entering residence, is there a bedroom on the main floor (no further steps)?: No  A bedroom is located on the following floor levels of residence (select all that apply):: 2nd [address] [facility] which floors of current residence have a bathroom (select all the apply):: 2nd Floor  Number of steps to 2nd floor from main floor: One Flight  Living Arrangements: Lives w/ Spouse/significant other  Is patient a ?: No    Activities of Daily Living Prior to Admission  Functional Status: Independent  Completes ADLs independently?: Yes  Ambulates independently?: Yes  Does patient use assisted devices?: No  Does patient currently own DME?: No  Does patient have a history of Outpatient Therapy (PT/OT)?: No  Does the patient have a history of Short-Term Rehab?: No  Does patient currently have [address]?: No         Patient Information Continued  Income Source: Unemployed  Does patient have prescription coverage?: Yes  Does patient receive dialysis treatments?: No  Does patient have a history of substance abuse?: No  Does patient have a history of Mental Health Diagnosis?: No    PHQ 2/9 Screening   Reviewed PHQ 2/9 Depression Screening Score?: No    Means of Transportation  Means of Transport to Appts[de-identified] Drives Self  In the past 12 months, has lack of transportation kept you from medical appointments or from getting medications?: No  In the past 12 months, has lack of transportation kept you from meetings, work, or from getting things needed for daily living?: No  Was application for public transport provided?: No    DISCHARGE DETAILS:    Discharge planning discussed with[de-identified] patient (ref made to [address] [place] [address] at Home)  Freedom of Choice: Yes    Contacts  Patient Contacts: Rachel Segundo to Patient[de-identified] [address]         Is the patient interested in [address] at discharge?: Yes  [address] requested[de-identified] Nursing, Physical [address] Name[de-identified] [address] Provider[de-identified] PCP  Home Health Services Needed[de-identified] Evaluate Functional Status and Safety, Strengthening/Theraputic Exercises to Improve Function  Homebound Criteria Met[de-identified] Requires the Assistance of Another Person for Safe Ambulation or to Leave the Home  Supporting Clincal Findings[de-identified] Dyspnea with Exertion, Fatigues Easliy in [employer], Limited Endurance    DME Referral Provided  Referral made for DME?: No         We would like to be able to fill any required prescriptions on discharge at our [address] [place] [address] and have them delivered to you at discharge in your room    Would you like to participate in this program? : No - Declined    Discharge Destination Plan[de-identified] Home  Transportation at Discharge?: No (a friend will transport home)

## 2021-08-16 NOTE — PROGRESS NOTES
[address] [facility]  Progress Note Sonali Daily 1955, 77 y o  male MRN: [id number]  Unit/Bed#:  Encounter: [id number]  Primary Care Provider: Kate De La Torre MD   Date and time admitted to hospital: 8/15/2021 11:55 AM    Severe protein-calorie malnutrition ([facility] [address] )  Assessment & Plan  Malnutrition Findings:     albumin less than 2, prominent clavicles       BMI Findings: Body mass index is 18 92 kg/m²  Nutrition consulted    May-Thurner syndrome  Assessment & Plan  Bilateral lower extremity duplexes pending  In the setting of malignancy and COVID-19, places him at significantly higher risk for thrombosis      Metastatic disease [facility]), 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 primary  Assessment & Plan  Patient with imaging consistent with bony metastasis involving the ribs and pelvis, with likely prostate primary    Underwent biopsy on 08/13, awaiting pathology  Following Urology as outpatient  Planned chemotherapeutic intervention likely to be delayed secondary to COVID-19 pneumonia    Essential hypertension  Assessment & Plan  Continue atenolol at home dosing    Complaint of melena  Assessment & Plan  Patient was told to expect some bleeding for the next 2 weeks secondary to prostatic biopsy performed on 08/13  INR slightly elevated on admission, hemoglobin stable  Continue to monitor  Holding anticoagulation for COVID-19 after discussion with patient    Severe sepsis ([facility] [address] )  Assessment & Plan  Secondary to COVID-19 pneumonia  See plan under COVID-19    Alkaline phosphatase elevation  Assessment & Plan  Likely related to bony metastasis  Continue to monitor    * Acute hypoxemic respiratory failure due to COVID-19 [facility])  Assessment & Plan  Upgraded to severe pathway upon evaluation  Requiring high-flow nasal cannula to maintain saturations greater than 90%  Code status DNR DNI per patient  COVID Treatment (Severe Pathway):  - Monitor inflammatory markers   - Antibiotics (may be d/c'd if procal normal X2)  - Lipitor 40 mg daily  - Steroids:  dexamethasone 0 1 mg/kg q 12 hours times 10 days  - Out of window for Convalescent Plasma  - May consider Actemra, patient with high suspicion for extensively metastatic prostate cancer  - Anticoagulation:  On hold due to recent prostate biopsy and melanotic stools, discussed risk versus benefit with patient and he politely declines  - Self proning as tolerated  - Remdesivir discontinued as now is on severe pathway and is not indicated      ----------------------------------------------------------------------------------------  HPI/24hr events:  Patient seen due to deterioration index alert  Patient with tachypnea, conversational dyspnea, and hypoxemia despite 6 L nasal cannula  Patient with notable retractions upon speaking, speaks in short sentences  Diagnosed with COVID-19 today, however endorses a 2 week history of feeling unwell with increasing shortness of breath and nonproductive cough  Admits to bloody stools status post prostatic biopsy performed on 08/13  Currently hemodynamically stable, however given increasing oxygen requirements and elevated severity of COVID-19 pneumonia, will transfer to step-down 1  Goals of care discussion held with patient, discussing COVID-19 in the setting of what appears to be widely metastatic neoplastic disease  Patient elects to decline intubation or resuscitative measures at this time  Patient states he will discuss his decision and worsening condition with his wife in the morning, as he does not want to upset her this evening        Disposition: Transfer to Stepdown Level 1   Code Status: Level 3 - DNAR and DNI  ---------------------------------------------------------------------------------------  SUBJECTIVE  Complains of generalized weakness and shortness of breath    Review of Systems  Review of systems was reviewed and negative unless stated above in HPI/24-hour events ---------------------------------------------------------------------------------------  OBJECTIVE    Vitals   Vitals:    08/15/21 1831 08/15/21 1834 08/15/21 1924 08/15/21 2000   BP: 102/58   97/56   BP Location: Left arm      Pulse: (!) 106   86   Resp: (!) 25   22   Temp: 98 3 °F (36 8 °C) 98 3 °F (36 8 °C)     TempSrc: Oral      SpO2: 91%  93% 91%   Weight:       Height:         Temp (24hrs), Av 3 °F (36 8 °C), Min:98 3 °F (36 8 °C), Max:98 3 °F (36 8 °C)  Current: Temperature: 98 3 °F (36 8 °C)          Respiratory:  SpO2: SpO2: 91 %  Nasal Cannula O2 Flow Rate (L/min): 6 L/min    Invasive/non-invasive ventilation settings   Respiratory    Lab Data (Last 4 hours)    None         O2/Vent Data (Last 4 hours)      08/15 1924          Non-Invasive Ventilation Mode HFNC (High flow)                   Physical Exam    Laboratory and Diagnostics:  Results from last 7 days   Lab Units 08/15/21  1230   WBC Thousand/uL 14 31*   HEMOGLOBIN g/dL 14 5   HEMATOCRIT % 44 6   PLATELETS Thousands/uL 251   NEUTROS PCT % 91*   MONOS PCT % 4     Results from last 7 days   Lab Units 08/15/21  1230   SODIUM mmol/L 145   POTASSIUM mmol/L 3 6   CHLORIDE mmol/L 107   CO2 mmol/L 26   ANION GAP mmol/L 12   BUN mg/dL 35*   CREATININE mg/dL 1 24   CALCIUM mg/dL 7 6*   GLUCOSE RANDOM mg/dL 130   ALT U/L 84*   AST U/L 80*   ALK PHOS U/L 353*   ALBUMIN g/dL 1 8*   TOTAL BILIRUBIN mg/dL 0 81          Results from last 7 days   Lab Units 08/15/21  1230   INR  1 57*   PTT seconds 27      Results from last 7 days   Lab Units 08/15/21  1230   TROPONIN I ng/mL <0 02         ABG:    VBG:          Micro        EKG:  Sinus tach, rate 100  Imaging: I have personally reviewed pertinent reports     and I have personally reviewed pertinent films in PACS    Intake and Output  I/O       701 - 08/15 0700 08/15 0701 -  07    IV Piggyback      Total Intake(mL/kg)   (34 4)    Net  +2000                Height and Weights   Height: 5' 9" (175 3 cm)  IBW (Ideal Body Weight): 70 7 kg  Body mass index is 18 92 kg/m²  Weight (last 2 days)     Date/Time   Weight    08/15/21 1158   58 1 (128 09)                Nutrition       Diet Orders   (From admission, onward)             Start     Ordered    08/15/21 1611  Diet Regular; Regular House  Diet effective now     Question Answer Comment   Diet Type Regular    Regular Regular House    RD to adjust diet per protocol?  Yes        08/15/21 1610                  Active Medications  Scheduled Meds:  Current Facility-Administered Medications   Medication Dose Route Frequency Provider Last Rate    acetaminophen  650 mg Oral Q6H PRN Guy Drake PA-C      [START ON 8/16/2021] atenolol  25 mg Oral Daily Guy Drake PA-C      atorvastatin  40 mg Oral Daily With [employer]      [START ON 8/16/2021] cefTRIAXone  1,000 mg Intravenous Q24H Stephen Eisenmenger, MD      [START ON 8/16/2021] dexamethasone  0 1 mg/kg Intravenous Q12H MEHUL Austin      doxycycline hyclate  100 mg Oral Q12H [facility] MEHUL Austin      ondansetron  4 mg Intravenous Q6H PRN Guy Drake PA-C      [START ON 8/16/2021] pantoprazole  20 mg Oral Early Morning Guy Drake PA-C      polyethylene glycol  17 g Oral Daily PRN Guy Drake PA-C      sodium chloride  75 mL/hr Intravenous Continuous MEHUL Momin       Continuous Infusions:  sodium chloride, 75 mL/hr      PRN Meds:   acetaminophen, 650 mg, Q6H PRN  ondansetron, 4 mg, Q6H PRN  polyethylene glycol, 17 g, Daily PRN        Invasive Devices Review  Invasive Devices     Peripheral Intravenous Line            Peripheral IV Distal;Right;Upper;Ventral (anterior) Arm -- days    Peripheral IV Left;Ventral (anterior) Forearm -- days                Rationale for remaining devices:  None applicable  ---------------------------------------------------------------------------------------  Advance Directive and Living Will: Power of :    POLST:    ---------------------------------------------------------------------------------------  Care Time Delivered:   Upon my evaluation, this patient had a high probability of imminent or life-threatening deterioration due to COVID-19 pneumonia and acute hypoxemic respiratory failure, which required my direct attention, intervention, and personal management  I have personally provided Thirty-five minutes ([address] [facility] to 1905) of critical care time, exclusive of procedures, teaching, family meetings, and any prior time recorded by providers other than myself  Ona Schilder, CRNP      Portions of the record may have been created with voice recognition software  Occasional wrong word or "sound a like" substitutions may have occurred due to the inherent limitations of voice recognition software    Read the chart carefully and recognize, using context, where substitutions have occurred

## 2021-08-16 NOTE — ACP (ADVANCE CARE PLANNING)
Critical Care Advanced Care Planning Note  Lorelei Fisher 77 y o  male MRN: [id number]  Unit/Bed#:  Encounter: [id number]    Lorelei Fisher is a 77 y o  male requiring critical care evaluation and advanced care planning  The patient has chronic comorbidities, including but not limited to extensively metastatic prostate cancer, which is now further complicated by the following acute conditions:  Acute hypoxemic respiratory failure secondary to COVID-19  Due to the severity of the patient's acute condition and/or the extent of chronic conditions, additional conversations pertaining to advanced care planning were required  Today's discussion, which was held in a face-to-face meeting, included The patient, and it was established that all stake holders understood the rationale for the advanced care planning  The patient was able to participate in the discussion  Summary of Discussion:  I spoke with Mr Thiago Farooq regarding his increasing oxygen requirements due to acute hypoxemic respiratory failure secondary to severe COVID-19 pneumonia  I asked him what he understands about the mass on his prostate with the multiple areas of bone lesions and pathological fracture that was identified on recent imaging  Patient states he had a prostatic biopsy 2 days ago and was waiting for pathology, however he understands that this is likely a very extensive metastatic process  I discussed his vaccine status, and the patient states that he just procrastinated" and did not obtain the vaccine  He understands that his COVID-19 infection is causing a rapid deterioration in his oxygenation status and that mechanical ventilation would not likely alter the course of his disease processes  He requests DNR DNI status at this time, further requesting that if measures short of intubation do not provide enough relief that he be transition to comfort measures    He further requests that this provider not reveal his decision to his wife at this time, as he plans on speaking to her in the morning  I encouraged him at this time to please consider calling her as he may continue to deteriorate overnight and not be able to speak  He politely declined  Total time spent, (15) minutes (1830 to 1845)        CODE STATUS: DNR/DNI - Level 3  POA:    POLST:        SIGNATURE: MEHUL Olguin  DATE: August 16, 2021  TIME: 2:24 AM

## 2021-08-16 NOTE — RESPIRATORY THERAPY NOTE
08/16/21 0426   Respiratory Assessment   Resp Comments pt currently remains on HFNC, no changes were made to his settings at this time, continue to titrate as tolerated     O2 Device Airvo   Non-Invasive Information   O2 Interface Device HFNC prongs   Non-Invasive Ventilation Mode HFNC (High flow)   $ Pulse Oximetry Spot Check Charge Completed   Non-Invasive Settings   FiO2 (%) 94   Flow (lpm) 60   Temperature (Set) 34   Non-Invasive Readings   Heater Temperature (Obs) 34   Skin Intervention Skin intact

## 2021-08-16 NOTE — ASSESSMENT & PLAN NOTE
Patient was told to expect some bleeding for the next 2 weeks secondary to prostatic biopsy performed on 08/13  INR slightly elevated on admission, hemoglobin stable  Received 1 dose of full-dose Lovenox, held after due to concern for bleeding  Continue to monitor  Holding anticoagulation for COVID-19 after discussion with patient

## 2021-08-16 NOTE — ASSESSMENT & PLAN NOTE
Upgraded to severe pathway upon evaluation  Requiring high-flow nasal cannula to maintain saturations greater than 90%-placed on maximum settings overnight due to desaturations  Code status DNR DNI per patient  COVID Treatment (Severe Pathway):  - Monitor inflammatory markers    D-dimer and CRP markedly elevated, however also in the setting of metastatic disease    - Antibiotics (may be d/c'd if procal normal X2)  - Lipitor 40 mg daily  - Steroids:  dexamethasone 0 1 mg/kg q 12 hours times 10 days  - Out of window for Convalescent Plasma  - May consider Actemra, although patient with high suspicion for extensively metastatic prostate cancer  - Anticoagulation:  On hold due to recent prostate biopsy and melanotic stools, discussed risk versus benefit with patient and he politely declines  - Self proning as tolerated  - Remdesivir discontinued as now is on severe pathway and is not indicated

## 2021-08-17 NOTE — PROGRESS NOTES
[facility] [facility]  Progress Note Bhavesh Iniguez 1955, 77 y o  male MRN: [id number]  Unit/Bed#:  Encounter: [id number]  Primary Care Provider: Alessandro Jeffrey MD   Date and time admitted to hospital: 8/15/2021 11:55 AM    Severe protein-calorie malnutrition ([address] )  Assessment & Plan  Malnutrition Findings:     albumin less than 2, prominent clavicles       BMI Findings: Body mass index is 18 92 kg/m²  Nutrition consulted    May-Thurner syndrome  Assessment & Plan  Bilateral lower extremity duplexes negative  In the setting of malignancy and COVID-19, places him at significantly higher risk for thrombosis  May alter risk/benefit stratification if positive  Would re-approach patient if needed      Metastatic disease [facility]), 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 primary  Assessment & Plan  Patient with imaging consistent with bony metastasis involving the ribs and pelvis, with likely prostate primary    Underwent biopsy on 08/13, awaiting pathology  Following Urology as outpatient  Planned chemotherapeutic intervention likely to be delayed secondary to COVID-19 pneumonia    Essential hypertension  Assessment & Plan  Continue atenolol at home dosing    Complaint of melena  Assessment & Plan  Patient was told to expect some bleeding for the next 2 weeks secondary to prostatic biopsy performed on 08/13  INR slightly elevated on admission, hemoglobin stable  Received 1 dose of full-dose Lovenox, held after due to concern for bleeding  Started on heparin drip yesterday  Monitor for any increased bleeding    Alkaline phosphatase elevation  Assessment & Plan  Likely related to bony metastasis  Judicious fluids  Continue to monitor    * Acute hypoxemic respiratory failure due to COVID-19 [facility])  Assessment & Plan  Upgraded to severe pathway upon evaluation  Requiring high-flow nasal cannula to maintain saturations greater than 90%-placed on maximum settings overnight due to desaturations  Code status DNR DNI per patient  COVID Treatment (Severe Pathway):  - Monitor inflammatory markers  D-dimer and CRP markedly elevated, however also in the setting of metastatic disease    - Antibiotics (may be d/c'd if procal normal X2)  - Lipitor 40 mg daily  - Steroids:  dexamethasone 0 1 mg/kg q 12 hours times 10 days  - Out of window for Convalescent Plasma  - May consider Actemra, although patient with high suspicion for extensively metastatic prostate cancer  - Anticoagulation:  On hold due to recent prostate biopsy and melanotic stools, discussed risk versus benefit with patient and he politely declines  - Self proning as tolerated  - Remdesivir discontinued as now is on severe pathway and is not indicated        ----------------------------------------------------------------------------------------  HPI/24hr events: Remains on HFNC  Patient appropriate for transfer out of the ICU today?: No  Disposition: Continue Stepdown Level 1 level of care   Code Status: Level 3 - DNAR and DNI  ---------------------------------------------------------------------------------------  SUBJECTIVE  "I feel cold tonight "    Review of Systems   Constitutional: Negative for chills and fever  HENT: Negative  Eyes: Negative  Respiratory: Negative for cough, shortness of breath, wheezing and stridor  Cardiovascular: Negative for chest pain, palpitations and leg swelling  Gastrointestinal: Negative for abdominal distention, abdominal pain, blood in stool, diarrhea, nausea and vomiting  Endocrine: Negative  Genitourinary: Negative for dysuria, flank pain, frequency and urgency  Musculoskeletal: Negative  Skin: Negative for rash and wound  Allergic/Immunologic: Negative  Neurological: Negative for dizziness, syncope, facial asymmetry, weakness, light-headedness, numbness and headaches  Hematological: Negative for adenopathy  Does not bruise/bleed easily  Psychiatric/Behavioral: Negative        Review of systems was reviewed and negative unless stated above in HPI/24-hour events   ---------------------------------------------------------------------------------------  OBJECTIVE    Vitals   Vitals:    21 1900 21 1949 21 2354 21 0320   BP: 121/61  126/61 136/69   BP Location: Right arm  Right arm Right arm   Pulse: 91  84 89   Resp: (!) 32  (!) 29 (!) 29   Temp: (!) 97 3 °F (36 3 °C)  (!) 97 4 °F (36 3 °C) (!) 97 3 °F (36 3 °C)   TempSrc: Oral  Oral Oral   SpO2: (!) 88% (!) 88% 94% 94%   Weight:       Height:         Temp (24hrs), Av 5 °F (36 4 °C), Min:97 °F (36 1 °C), Max:98 4 °F (36 9 °C)  Current: Temperature: (!) 97 3 °F (36 3 °C)          Respiratory:  SpO2: SpO2: 94 %  Nasal Cannula O2 Flow Rate (L/min): 6 L/min    Invasive/non-invasive ventilation settings   Respiratory    Lab Data (Last 4 hours)    None         O2/Vent Data (Last 4 hours)       0409          Non-Invasive Ventilation Mode HFNC (High flow)                   Physical Exam  Vitals and nursing note reviewed  Constitutional:       General: He is not in acute distress  Appearance: He is well-developed  HENT:      Head: Normocephalic and atraumatic  Eyes:      Pupils: Pupils are equal, round, and reactive to light  Neck:      Vascular: No JVD  Cardiovascular:      Rate and Rhythm: Normal rate and regular rhythm  Heart sounds: Normal heart sounds  No murmur heard  Pulmonary:      Effort: Pulmonary effort is normal  Tachypnea present  No respiratory distress  Breath sounds: Decreased breath sounds present  Abdominal:      General: Bowel sounds are normal  There is no distension  Palpations: Abdomen is soft  Tenderness: There is no abdominal tenderness  Musculoskeletal:         General: Normal range of motion  Cervical back: Normal range of motion and neck supple  Lymphadenopathy:      Cervical: No cervical adenopathy  Skin:     General: Skin is warm and dry  Findings: No rash  Neurological:      Mental Status: He is alert and oriented to person, place, and time  Cranial Nerves: No cranial nerve deficit  Laboratory and Diagnostics:  Results from last 7 days   Lab Units 08/16/21  1143 08/16/21  0443 08/15/21  1230   WBC Thousand/uL 11 37* 10 95* 14 31*   HEMOGLOBIN g/dL 11 7* 12 2 14 5   HEMATOCRIT % 36 4* 38 1 44 6   PLATELETS Thousands/uL 228 232 251   NEUTROS PCT %  --   --  91*   MONOS PCT %  --   --  4   MONO PCT %  --  4  --      Results from last 7 days   Lab Units 08/16/21  0443 08/15/21  1230   SODIUM mmol/L 145 145   POTASSIUM mmol/L 3 8 3 6   CHLORIDE mmol/L 112* 107   CO2 mmol/L 23 26   ANION GAP mmol/L 10 12   BUN mg/dL 32* 35*   CREATININE mg/dL 0 91 1 24   CALCIUM mg/dL 7 1* 7 6*   GLUCOSE RANDOM mg/dL 124 130   ALT U/L 101* 84*   AST U/L 123* 80*   ALK PHOS U/L 328* 353*   ALBUMIN g/dL 1 5* 1 8*   TOTAL BILIRUBIN mg/dL 0 58 0 81     Results from last 7 days   Lab Units 08/16/21  0443   MAGNESIUM mg/dL 2 4   PHOSPHORUS mg/dL 3 1      Results from last 7 days   Lab Units 08/17/21  0108 08/16/21  1822 08/16/21  1143 08/16/21  0443 08/15/21  1230   INR   --   --  1 54* 1 48* 1 57*   PTT seconds 50* 44* 27  --  27      Results from last 7 days   Lab Units 08/15/21  1230   TROPONIN I ng/mL <0 02         ABG:    VBG:    Results from last 7 days   Lab Units 08/16/21  0443 08/15/21  1704   PROCALCITONIN ng/ml 0 76* 1 03*       Micro  Results from last 7 days   Lab Units 08/15/21  1232 08/15/21  1231   BLOOD CULTURE  No Growth at 24 hrs  No Growth at 24 hrs  EKG: NSR  Imaging: I have personally reviewed pertinent reports  Intake and Output  I/O       08/15 0701 - 08/16 0700 08/16 0701 - 08/17 0700    P  O   336    I V  (mL/kg) 680 (11 7) 1266 9 (21 8)    IV Piggyback 2000 50    Total Intake(mL/kg) 2680 (46 1) 1652 9 (28 4)    Urine (mL/kg/hr) 550 525 (0 4)    Total Output 550 525    Net +2130 +1127 9                Height and Weights   Height: 5' 9" (175 3 cm)  IBW (Ideal Body Weight): 70 7 kg  Body mass index is 18 92 kg/m²  Weight (last 2 days)     Date/Time   Weight    08/15/21 1158   58 1 (128 09)                Nutrition       Diet Orders   (From admission, onward)             Start     Ordered    08/15/21 1611  Diet Regular; Regular House  Diet effective now     Question Answer Comment   Diet Type Regular    Regular Regular House    RD to adjust diet per protocol?  Yes        08/15/21 1610                  Active Medications  Scheduled Meds:  Current Facility-Administered Medications   Medication Dose Route Frequency Provider Last Rate    acetaminophen  650 mg Oral Q6H PRN Larri Maxcy, PA-C      atenolol  25 mg Oral Daily Larri Maxcy, PA-C      atorvastatin  40 mg Oral Daily With Dinner Larri Maxcy, PA-C      cefTRIAXone  1,000 mg Intravenous Q24H Kaiden ESCOBEDO MD 1,000 mg (08/16/21 1204)    dexamethasone  0 1 mg/kg Intravenous Q12H [facility], CRNP      doxycycline hyclate  100 mg Oral Q12H [facility]      heparin (porcine)  3-20 Units/kg/hr (Order-Specific) Intravenous Titrated PalmeredePETER AcevedoNP 16 Units/kg/hr (08/17/21 0246)    heparin (porcine)  1,650 Units Intravenous Q1H PRN Amdreweo Azucena CRNP      heparin (porcine)  3,300 Units Intravenous Q1H PRN AmdreweMEHUL Acevedo      ondansetron  4 mg Intravenous Q6H PRN Larri Maxcy, PA-C      pantoprazole  20 mg Oral Early Morning Larri Maxcy, PA-C      polyethylene glycol  17 g Oral Daily PRN Larri Maxcy, PA-C      sodium chloride  75 mL/hr Intravenous Continuous [facility], CRNP 75 mL/hr (08/16/21 2223)     Continuous Infusions:  heparin (porcine), 3-20 Units/kg/hr (Order-Specific), Last Rate: 16 Units/kg/hr (08/17/21 0246)  sodium chloride, 75 mL/hr, Last Rate: 75 mL/hr (08/16/21 2223)      PRN Meds:   acetaminophen, 650 mg, Q6H PRN  heparin (porcine), 1,650 Units, Q1H PRN  heparin (porcine), 3,300 Units, Q1H PRN  ondansetron, 4 mg, Q6H PRN  polyethylene glycol, 17 g, Daily PRN        Invasive Devices Review  Invasive Devices     Peripheral Intravenous Line            Peripheral IV Left;Ventral (anterior) Forearm -- days    Peripheral IV 08/16/21 Proximal;Right;Ventral (anterior) Forearm <1 day                Rationale for remaining devices: HFNC for resp failure  ---------------------------------------------------------------------------------------  Advance Directive and Living Will:      Power of :    POLST:    ---------------------------------------------------------------------------------------  Care Time Delivered:   No Critical Care time spent       [facility], MEHUL      Portions of the record may have been created with voice recognition software  Occasional wrong word or "sound a like" substitutions may have occurred due to the inherent limitations of voice recognition software    Read the chart carefully and recognize, using context, where substitutions have occurred

## 2021-08-17 NOTE — RESPIRATORY THERAPY NOTE
08/17/21 0409   Respiratory Assessment   Assessment Type Assess only   General Appearance Awake   Respiratory Pattern Tachypneic   Chest Assessment Chest expansion symmetrical   Bilateral Breath Sounds Diminished   Resp Comments no changes made at this time   Non-Invasive Information   O2 Interface Device HFNC prongs   Non-Invasive Ventilation Mode HFNC (High flow)   $ Pulse Oximetry Spot Check Charge Completed   Non-Invasive Settings   FiO2 (%) 55   Flow (lpm) 50   Temperature (Set) 31   Non-Invasive Readings   Heater Temperature (Obs) 31   Skin Intervention Skin intact

## 2021-08-17 NOTE — RESPIRATORY THERAPY NOTE
08/16/21 9709   Respiratory Assessment   Resp Comments patients settings decreased at this time, pt's SPO2 doing well   Non-Invasive Information   O2 Interface Device HFNC prongs   Non-Invasive Ventilation Mode HFNC (High flow)   Non-Invasive Settings   FiO2 (%) 55   Flow (lpm) 50   Temperature (Set) 31   Non-Invasive Readings   Heater Temperature (Obs) 31   Skin Intervention Skin intact

## 2021-08-17 NOTE — RESPIRATORY THERAPY NOTE
08/17/21 0829   Non-Invasive Information   O2 Interface Device HFNC prongs   Non-Invasive Ventilation Mode HFNC (High flow)   $ Pulse Oximetry Spot Check Charge Completed   Non-Invasive Settings   FiO2 (%) 55   Flow (lpm) 50   Temperature (Set) 31   Non-Invasive Readings   Heater Temperature (Obs) 31   Skin Intervention Skin intact   Maintenance   Water bag changed Yes       No changes at this time

## 2021-08-17 NOTE — PLAN OF CARE
Problem: OCCUPATIONAL THERAPY ADULT  Goal: Performs self-care activities at highest level of function for planned discharge setting  See evaluation for individualized goals  Description: Treatment Interventions: ADL retraining, Functional transfer training, UE strengthening/ROM, Endurance training, Equipment evaluation/education, Patient/family training, Compensatory technique education, Energy conservation, Activityengagement          See flowsheet documentation for full assessment, interventions and recommendations  Note: Limitation: Decreased ADL status, Decreased UE strength, Decreased endurance, Decreased self-care trans, Decreased high-level ADLs  Prognosis: Good  Assessment: Patient is a 77 y o  male seen for OT evaluation s/p admit to [address] [place] [address] on 8/15/2021 w/Acute hypoxemic respiratory failure due to COVID-19 [facility])  Commorbidities affecting patient's functional performance at time of assessment include: alkaline phosphatase elevation, severe sepsis, complaint of melena, essential hypertension, metastatic disease, 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 primary, May-Thurner syndrome, and severe protein calorie malnutrition  Orders placed for OT evaluation and treatment  Performed at least two patient identifiers during session including name and wristband  Prior to admission,  Patient reported independnet with ADLs/ IADLs, ambulatory with no AD  Patient lives with spouse in a 2 story house, 8 ADEN/ full flight to second floor  Personal factors affecting patient at time of initial evaluation include: steps to enter, decreased initiation and engagement, difficulty performing ADLs and difficulty performing IADLs  Upon evaluation, patient requires minimal  assist for UB ADLs, moderate assist for LB ADLs, transfers and functional ambulation in room and bathroom with minimal  assist, with the use of Rolling Walker    Patient is alert and oriented x 4Occupational performance is affected by the following deficits: decreased functional use of BUEs, decreased muscle strength, impaired gross motor coordination, dynamic sit/ stand balance deficit with poor standing tolerance time for self care and functional mobility and decreased activity tolerance  Patient to benefit from continued Occupational Therapy treatment while in the hospital to address deficits as defined above and maximize level of functional independence with ADLs and functional mobility  Occupational Performance areas to address include: bathing/ shower, dressing, toilet hygiene, transfer to all surfaces, functional ambulation, functional mobility, IADLs: safety procedures and Leisure Participation  From OT standpoint, recommendation at time of d/c would be Home with family support, [address] and Home OT         OT Discharge Recommendation: Post acute rehabilitation services

## 2021-08-17 NOTE — ASSESSMENT & PLAN NOTE
Bilateral lower extremity duplexes negative  In the setting of malignancy and COVID-19, places him at significantly higher risk for thrombosis  May alter risk/benefit stratification if positive    Would re-approach patient if needed

## 2021-08-17 NOTE — OCCUPATIONAL THERAPY NOTE
Occupational Therapy Evaluation        Patient Name: Adria Byrd  JDGID'A Date: 8/17/2021 08/17/21 1201   OT Last Visit   OT Visit Date 08/17/21   Note Type   Note type Evaluation   Restrictions/Precautions   Weight Bearing Precautions Per Order No   Braces or Orthoses Other (Comment)  (none per patient)   Other Precautions Contact/isolation; Airborne/isolation;Droplet precautions; Chair Alarm; Bed Alarm;Multiple lines;Telemetry;O2   Pain Assessment   Pain Assessment Tool Pain Assessment not indicated - pt denies pain   Pain Score No Pain   Home Living   Type of Home Apartment   Home Layout Two level;Bed/bath upstairs; Performs ADLs on one level; Other (Comment)  (8 ADEN/ full flight to second floor)   Bathroom Shower/Tub Walk-in shower   Bathroom Toilet Standard   Bathroom Equipment Other (Comment)   [facility] Other (Comment)   Prior Function   Level of [place] Independent with ADLs and functional mobility   Lives With Spouse   Receives Help From Family   ADL Assistance Independent   IADLs Independent   Falls in the last 6 months 0   Vocational Retired   Comments ambulatory with no AD,    Lifestyle   Autonomy Patient reported independnet with ADLs/ IADLs, ambulatory with no AD  Patient lives with spouse in a 2 story house, 8 ADEN/ full flight to second floor      Reciprocal Relationships Supportive Family   Service to Others Retired Clinical pharmacist    Intrinsic Gratification eduardo    Psychosocial   Psychosocial (WDL) [address] Haseeb [address]on 3701 4  701 [address] [place] [address] 3  Moderate Assistance   [address] 3  Moderate [address] [facility] 4  Minimal Assistance   Bed Mobility   Additional Comments received patient OOb to Recliner chair, RN reported assist of 1 for OOB transfers   Transfers   Sit to Stand 4  Minimal assistance   Additional items Assist x 1; Increased time required;Verbal cues;Armrests   Stand to Sit 4  Minimal assistance   Additional items Assist x 1; Increased time required;Armrests   Functional Mobility   Functional Mobility 4  Minimal assistance   Additional items Rolling walker   Balance   Static Sitting Good   Dynamic Sitting Fair +   Static Standing Fair -   Dynamic Standing Poor +   Activity Tolerance   Activity Tolerance Patient limited by fatigue   Nurse Made Aware JOSLYN Crespo made aware of therapy outcome   RUE Assessment   RUE Assessment WFL  (strength ~4/5)   LUE Assessment   LUE Assessment WFL  (strength ~4/5)   Hand Function   Gross Motor Coordination Impaired   Fine Motor Coordination Functional   Sensation   Light Touch No apparent deficits  (BUEs)   Proprioception   Proprioception No apparent deficits   Vision-Basic Assessment   Current Vision Does not wear glasses   Patient Visual Report Other (Comment)  (No significant changes reported)   Cognition   Overall Cognitive Status WFL   Arousal/Participation Alert; Responsive; Cooperative   Attention Within functional limits   Orientation Level Oriented X4   Memory Within functional limits   Following Commands Follows all commands and directions without difficulty   Assessment   Limitation Decreased ADL status; Decreased UE strength;Decreased endurance;Decreased self-care trans;Decreased high-level ADLs   Prognosis Good   Assessment Patient is a 77 y o  male seen for OT evaluation s/p admit to [address] [facility] on 8/15/2021 w/Acute hypoxemic respiratory failure due to COVID-19 [facility])   Commorbidities affecting patient's functional performance at time of assessment include: alkaline phosphatase elevation, severe sepsis, complaint of melena, essential hypertension, metastatic disease, 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 primary, May-Thurner syndrome, and severe protein calorie malnutrition  Orders placed for OT evaluation and treatment  Performed at least two patient identifiers during session including name and wristband  Prior to admission,  Patient reported independnet with ADLs/ IADLs, ambulatory with no AD  Patient lives with spouse in a 2 story house, 8 ADEN/ full flight to second floor  Personal factors affecting patient at time of initial evaluation include: steps to enter, decreased initiation and engagement, difficulty performing ADLs and difficulty performing IADLs  Upon evaluation, patient requires minimal  assist for UB ADLs, moderate assist for LB ADLs, transfers and functional ambulation in room and bathroom with minimal  assist, with the use of Rolling Walker  Patient is alert and oriented x 4Occupational performance is affected by the following deficits: decreased functional use of BUEs, decreased muscle strength, impaired gross motor coordination, dynamic sit/ stand balance deficit with poor standing tolerance time for self care and functional mobility and decreased activity tolerance  Patient to benefit from continued Occupational Therapy treatment while in the hospital to address deficits as defined above and maximize level of functional independence with ADLs and functional mobility  Occupational Performance areas to address include: bathing/ shower, dressing, toilet hygiene, transfer to all surfaces, functional ambulation, functional mobility, IADLs: safety procedures and Leisure Participation  From OT standpoint, recommendation at time of d/c would be Home with family support, [address] and Home OT  Goals   Patient Goals to return home    Plan   Treatment Interventions ADL retraining;Functional transfer training;UE strengthening/ROM; Endurance training;Equipment evaluation/education;Patient/family training; Compensatory technique education; Energy conservation; Activityengagement   Goal Expiration Date 08/31/21   OT Frequency 2-3x/wk   Recommendation OT Discharge Recommendation Post acute rehabilitation services   AM-PAC Daily Activity Inpatient   Lower Body Dressing 2   Bathing 2   Toileting 3   Upper Body Dressing 3   Grooming 3   Eating 4   Daily Activity Raw Score 17   Daily Activity Standardized Score (Calc for Raw Score >=11) 37 26   AM-PAC Applied Cognition Inpatient   Following a Speech/Presentation 4   Understanding Ordinary Conversation 4   Taking Medications 4   Remembering Where Things Are Placed or Put Away 4   Remembering List of 4-5 Errands 4   Taking Care of Complicated Tasks 4   Applied Cognition Raw Score 24   Applied Cognition Standardized Score 62 21   Barthel Index   Feeding 10   Bathing 0   Grooming Score 5   Dressing Score 5   Bladder Score 10   Bowels Score 10   Toilet Use Score 5   Transfers (Bed/Chair) Score 10   Mobility (Level Surface) Score 0   Stairs Score 0   Barthel Index Score 55       1 - Patient will verbalize and demonstrate use of energy conservation/ deep breathing technique and work simplification skills during functional activity with no verbal cues  2 - Patient will verbalize and demonstrate good body mechanics and joint protection techniques during  ADLs/ IADLs with no verbal cues  3 - Patient will increase OOB/ sitting tolerance to 2-4 hours per day for increased participation in self care and leisure tasks with no s/s of exertion  4 - Patient will increase standing tolerance time to 5  minutes with unilateral UE support to complete sink level ADLs@ mod I level  5 - Patient will increase sitting tolerance at edge of bed to 20 minutes to complete UB ADLs @ set up assist level  6 - Patient will transfer bed to Chair / toilet at Set up assist level with AD as indicated  7 - Patient will complete UB ADLs with set up assist      8 - Patient will complete LB ADLs with min assist with the use of adaptive equipment       9 - Patient will complete toileting hygiene with set up assist/ supervision for thoroughness    10 - Patient/ Family  will demonstrate competency with UE Home Exercise Program

## 2021-08-17 NOTE — RESPIRATORY THERAPY NOTE
08/17/21 1931   Respiratory Assessment   Assessment Type Assess only   General Appearance Awake; Alert   Respiratory Pattern Tachypneic;Labored   Chest Assessment Chest expansion symmetrical   Bilateral Breath Sounds Diminished   Resp Comments patient found on HFNC and NRB, increased HFNC settings at this time     O2 Device airvo   Non-Invasive Information   O2 Interface Device HFNC prongs   Non-Invasive Ventilation Mode HFNC (High flow)   SpO2 93 %   $ Pulse Oximetry Spot Check Charge Completed   Non-Invasive Settings   FiO2 (%) 91   Flow (lpm) 60   Temperature (Set) 31   Non-Invasive Readings   Heater Temperature (Obs) 31   Skin Intervention Skin intact

## 2021-08-17 NOTE — RESPIRATORY THERAPY NOTE
08/17/21 1537   Non-Invasive Information   O2 Interface Device HFNC prongs   Non-Invasive Ventilation Mode HFNC (High flow)   $ Pulse Oximetry Spot Check Charge Completed   Non-Invasive Settings   FiO2 (%) 50   Flow (lpm) 50   Temperature (Set) 31   Non-Invasive Readings   Heater Temperature (Obs) 31   Skin Intervention Skin intact   Maintenance   Water bag changed No

## 2021-08-17 NOTE — ASSESSMENT & PLAN NOTE
Patient was told to expect some bleeding for the next 2 weeks secondary to prostatic biopsy performed on 08/13  INR slightly elevated on admission, hemoglobin stable  Received 1 dose of full-dose Lovenox, held after due to concern for bleeding  Started on heparin drip yesterday  Monitor for any increased bleeding

## 2021-08-18 NOTE — CASE MANAGEMENT
Case Management Progress Note    Patient name Kj Irizarry  Location / MRN [id number]  : 1955 Date 2021       LOS (days): 3  Geometric Mean LOS (GMLOS) (days): 4 80  Days to GMLOS:1 7        BUNDLE:      OBJECTIVE:  Pt is a 77y o  year old /Civil [employer], white or  [1], male with Mormon preference of None admitted on 8/15/2021 11:55 AM  Pt is admitted to  at [address] [place] [address] with complaints of Acute hypoxemic respiratory failure due to COVID-19 [facility])   Current admission status: Inpatient  Preferred Pharmacy:   [address]   Phone: [phone] Fax: [phone]    Primary Care Provider: Jonn Lo MD    Primary Insurance: THE [facility]  Secondary Insurance:     PROGRESS NOTE:    Pt is requiring HFNC-60 lpm-100% and has needed a non-rebreather prn  No tentative date for discharge

## 2021-08-18 NOTE — RESPIRATORY THERAPY NOTE
08/18/21 1117   Respiratory Assessment   Assessment Type Assess only   General Appearance Alert; Awake   Respiratory Pattern Tachypneic   Chest Assessment Chest expansion symmetrical   Bilateral Breath Sounds Diminished   Cough None   Resp Comments Pt sitting in chair in no apparent distress  Pt state that he is feeling a little better  Will cont  to wean as able     O2 Device Airvo   Non-Invasive Information   O2 Interface Device HFNC prongs   Non-Invasive Ventilation Mode HFNC (High flow)   SpO2 98 %   $ Pulse Oximetry Spot Check Charge Completed   Non-Invasive Settings   FiO2 (%) 85   Flow (lpm) 60   Temperature (Set) 31   Non-Invasive Readings   Heater Temperature (Obs) 31   Skin Intervention Skin intact   Maintenance   Water bag changed No

## 2021-08-18 NOTE — RESPIRATORY THERAPY NOTE
08/18/21 1613   Non-Invasive Information   O2 Interface Device HFNC prongs   Non-Invasive Ventilation Mode HFNC (High flow)   SpO2 95 %   $ Pulse Oximetry Spot Check Charge Completed   Non-Invasive Settings   FiO2 (%) 75   Flow (lpm) 60   Temperature (Set) 31   Non-Invasive Readings   Heater Temperature (Obs) 31   Skin Intervention Skin intact   Maintenance   Water bag changed No

## 2021-08-18 NOTE — RESPIRATORY THERAPY NOTE
08/18/21 0842   Non-Invasive Information   O2 Interface Device HFNC prongs  (+NRB)   Non-Invasive Ventilation Mode HFNC (High flow)   SpO2 (!) 88 %   $ Pulse Oximetry Spot Check Charge Completed   Non-Invasive Settings   FiO2 (%) 80  (increased to 90 due to sats in low 80s)   Flow (lpm) 60   Temperature (Set) 31   Non-Invasive Readings   Heater Temperature (Obs) 31   Skin Intervention Skin intact   Maintenance   Water bag changed Yes

## 2021-08-18 NOTE — ASSESSMENT & PLAN NOTE
Bilateral lower extremity duplexes negative  In the setting of malignancy and COVID-19, places him at significantly higher risk for thrombosis  May alter risk/benefit stratification    Would re-approach patient if needed

## 2021-08-18 NOTE — PROGRESS NOTES
[facility]  Progress Note Allena Cowden 1955, 77 y o  male MRN: [id number]  Unit/Bed#:  Encounter: [id number]  Primary Care Provider: Zulma Newman MD   Date and time admitted to hospital: 8/15/2021 11:55 AM    Severe protein-calorie malnutrition ([address] )  Assessment & Plan  Malnutrition Findings:     albumin less than 2, prominent clavicles       BMI Findings: Body mass index is 18 92 kg/m²  Nutrition consulted    May-Thurner syndrome  Assessment & Plan  Bilateral lower extremity duplexes negative  In the setting of malignancy and COVID-19, places him at significantly higher risk for thrombosis  May alter risk/benefit stratification  Would re-approach patient if needed      Metastatic disease [facility]), 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 primary  Assessment & Plan  Patient with imaging consistent with bony metastasis involving the ribs and pelvis, with likely prostate primary    Underwent biopsy on 08/13, awaiting pathology  Following Urology as outpatient  Planned chemotherapeutic intervention likely to be delayed secondary to COVID-19 pneumonia    Essential hypertension  Assessment & Plan  Continue atenolol at home dosing    Complaint of melena  Assessment & Plan  Patient was told to expect some bleeding for the next 2 weeks secondary to prostatic biopsy performed on 08/13  INR slightly elevated on admission, hemoglobin stable  Received 1 dose of full-dose Lovenox, held after due to concern for bleeding  Started on heparin drip yesterday  Monitor for any increased bleeding    Alkaline phosphatase elevation  Assessment & Plan  Likely related to bony metastasis  Judicious IVF  Continue to monitor    * Acute hypoxemic respiratory failure due to COVID-19 [facility])  Assessment & Plan  Upgraded to severe pathway upon evaluation  Requiring high-flow nasal cannula to maintain saturations greater than 90%-placed on maximum settings overnight due to desaturations  Code status DNR DNI per patient  COVID Treatment (Severe Pathway):  - Monitor inflammatory markers  D-dimer and CRP markedly elevated, however also in the setting of metastatic disease  - Antibiotics (may be d/c'd if procal normal X2)  - Lipitor 40 mg daily  - Steroids:  dexamethasone 0 1 mg/kg q 12 hours times 10 days  - Out of window for Convalescent Plasma  - Actemra: consider dosing if CRP still elevated today  - Anticoagulation:  Continue heparin gtt  - Self proning as tolerated  - Remdesivir discontinued as now is on severe pathway and is not indicated        ----------------------------------------------------------------------------------------  HPI/24hr events: Fluctuations in SpO2 overnight requiring increase in HFNC to 80%/60LPM     Patient appropriate for transfer out of the ICU today?: No  Disposition: Continue Stepdown Level 1 level of care   Code Status: Level 3 - DNAR and DNI  ---------------------------------------------------------------------------------------  SUBJECTIVE  "My breathing is smoother now"  Reports some subjective improvement in SOB from yesterday  Admits to some chest pain with cough  Denies NV, abdominal pain  Denies dizziness, lightheadedness      Review of Systems  Review of systems was reviewed and negative unless stated above in HPI/24-hour events   ---------------------------------------------------------------------------------------  OBJECTIVE    Vitals   Vitals:    21 1602 21 19321 2326   BP: 124/73  143/81 135/83   BP Location: Right arm  Right arm Right arm   Pulse: 77  82 86   Resp: 20  (!) 28 (!) 27   Temp: (!) 96 °F (35 6 °C)  (!) 96 °F (35 6 °C) 98 1 °F (36 7 °C)   TempSrc: Axillary  Axillary Oral   SpO2: 95% 93% (!) 86% 93%   Weight:       Height:         Temp (24hrs), Av 2 °F (36 2 °C), Min:96 °F (35 6 °C), Max:98 1 °F (36 7 °C)  Current: Temperature: 98 1 °F (36 7 °C)          Respiratory:  SpO2: SpO2: 93 %, SpO2 Activity: SpO2 Activity: At Rest, SpO2 Device: O2 Device: High flow nasal cannula  Nasal Cannula O2 Flow Rate (L/min): 6 L/min    Invasive/non-invasive ventilation settings   Respiratory    Lab Data (Last 4 hours)    None         O2/Vent Data (Last 4 hours)      08/18 0325          Non-Invasive Ventilation Mode HFNC (High flow)                   Physical Exam  Vitals and nursing note reviewed  Constitutional:       Appearance: He is well-developed  HENT:      Head: Normocephalic and atraumatic  Nose:      Comments: HFNC in place     Mouth/Throat:      Mouth: Mucous membranes are moist    Eyes:      Conjunctiva/sclera: Conjunctivae normal    Cardiovascular:      Rate and Rhythm: Normal rate and regular rhythm  Heart sounds: No murmur heard  Pulmonary:      Effort: Pulmonary effort is normal  No respiratory distress  Breath sounds: No wheezing or rales  Comments: Breath sounds diminished in bilateral bases  Abdominal:      General: There is no distension  Palpations: Abdomen is soft  Tenderness: There is no abdominal tenderness  Musculoskeletal:      Cervical back: Neck supple  Right lower leg: No edema  Left lower leg: No edema  Skin:     General: Skin is warm and dry  Neurological:      Mental Status: He is alert and oriented to person, place, and time           Laboratory and Diagnostics:  Results from last 7 days   Lab Units 08/18/21  0446 08/17/21  0520 08/16/21  1143 08/16/21  0443 08/15/21  1230   WBC Thousand/uL 13 59* 16 40* 11 37* 10 95* 14 31*   HEMOGLOBIN g/dL 12 5 12 2 11 7* 12 2 14 5   HEMATOCRIT % 38 0 36 6 36 4* 38 1 44 6   PLATELETS Thousands/uL 279 254 228 232 251   NEUTROS PCT % 88* 89*  --   --  91*   MONOS PCT % 5 4  --   --  4   MONO PCT %  --   --   --  4  --      Results from last 7 days   Lab Units 08/18/21  0446 08/17/21  0520 08/16/21  0443 08/15/21  1230   SODIUM mmol/L 143 143 145 145   POTASSIUM mmol/L 4 1 3 8 3 8 3 6   CHLORIDE mmol/L 111* 111* 112* 107   CO2 mmol/L 21 19* 23 26 ANION GAP mmol/L 11 13 10 12   BUN mg/dL 32* 32* 32* 35*   CREATININE mg/dL 0 94 0 83 0 91 1 24   CALCIUM mg/dL 7 4* 7 1* 7 1* 7 6*   GLUCOSE RANDOM mg/dL 121 138 124 130   ALT U/L 88* 89* 101* 84*   AST U/L 53* 59* 123* 80*   ALK PHOS U/L 317* 301* 328* 353*   ALBUMIN g/dL 1 6* 1 4* 1 5* 1 8*   TOTAL BILIRUBIN mg/dL 0 46 0 39 0 58 0 81     Results from last 7 days   Lab Units 08/17/21  0520 08/16/21  0443   MAGNESIUM mg/dL 2 2 2 4   PHOSPHORUS mg/dL 2 5 3 1      Results from last 7 days   Lab Units 08/18/21  0033 08/17/21  1636 08/17/21  0844 08/17/21  0108 08/16/21  1822 08/16/21  1143 08/16/21  0443 08/15/21  1230   INR   --   --   --   --   --  1 54* 1 48* 1 57*   PTT seconds 102* 77* 50* 50* 44* 27  --  27      Results from last 7 days   Lab Units 08/15/21  1230   TROPONIN I ng/mL <0 02         ABG:    VBG:    Results from last 7 days   Lab Units 08/17/21  1513 08/16/21  0443 08/15/21  1704   PROCALCITONIN ng/ml 0 25 0 76* 1 03*       Micro  Results from last 7 days   Lab Units 08/15/21  1232 08/15/21  1231   BLOOD CULTURE  No Growth at 48 hrs  No Growth at 48 hrs  Imaging: I have personally reviewed pertinent reports  and I have personally reviewed pertinent films in PACS    Intake and Output  I/O       08/16 0701 - 08/17 0700 08/17 0701 - 08/18 0700    P  O  336 240    I V  (mL/kg) 1266 9 (21 8) 379 5 (6 5)    IV Piggyback 50 50    Total Intake(mL/kg) 1652 9 (28 4) 669 5 (11 5)    Urine (mL/kg/hr) 525 (0 4) 675 (0 5)    Total Output 525 675    Net +1127 9 -5 5                Height and Weights   Height: 5' 9" (175 3 cm)  IBW (Ideal Body Weight): 70 7 kg  Body mass index is 18 92 kg/m²  Weight (last 2 days)     None            Nutrition       Diet Orders   (From admission, onward)             Start     Ordered    08/15/21 1611  Diet Regular; Regular House  Diet effective now     Question Answer Comment   Diet Type Regular    Regular Regular House    RD to adjust diet per protocol?  Yes        08/15/21 1610                  Active Medications  Scheduled Meds:  Current Facility-Administered Medications   Medication Dose Route Frequency Provider Last Rate    acetaminophen  650 mg Oral Q6H PRN Matt Jacobson PA-C      atenolol  25 mg Oral Daily Matt Jacobson PA-C      atorvastatin  40 mg Oral Daily With Dinner Matt Jacobson PA-C      cefTRIAXone  1,000 mg Intravenous Q24H Afshin ESCOBEDO MD 1,000 mg (08/17/21 1257)    dexamethasone  0 1 mg/kg Intravenous Q12H MEHUL Jefferson      doxycycline hyclate  100 mg Oral Q12H [facility] MEHUL Jefferson      heparin (porcine)  3-20 Units/kg/hr (Order-Specific) Intravenous Titrated ValMEHUL Beltran 16 Units/kg/hr (08/18/21 0120)    heparin (porcine)  1,650 Units Intravenous Q1H PRN MEHUL Rodney      heparin (porcine)  3,300 Units Intravenous Q1H PRN MEHUL Rodney      ondansetron  4 mg Intravenous Q6H PRN Matt Jacobson PA-C      pantoprazole  20 mg Oral Early Morning Matt Jacobson PA-C      polyethylene glycol  17 g Oral Daily PRN Matt Jacobson PA-C       Continuous Infusions:  heparin (porcine), 3-20 Units/kg/hr (Order-Specific), Last Rate: 16 Units/kg/hr (08/18/21 0120)      PRN Meds:   acetaminophen, 650 mg, Q6H PRN  heparin (porcine), 1,650 Units, Q1H PRN  heparin (porcine), 3,300 Units, Q1H PRN  ondansetron, 4 mg, Q6H PRN  polyethylene glycol, 17 g, Daily PRN        Invasive Devices Review  Invasive Devices     Peripheral Intravenous Line            Peripheral IV Left;Ventral (anterior) Forearm -- days    Peripheral IV 08/16/21 Proximal;Right;Ventral (anterior) Forearm 1 day                Rationale for remaining devices: n/a  ---------------------------------------------------------------------------------------  Advance Directive and Living Will:      Power of :    POLST:    ---------------------------------------------------------------------------------------  Care Time Delivered:   No Critical Care time negro Matthews PA-C      Portions of the record may have been created with voice recognition software  Occasional wrong word or "sound a like" substitutions may have occurred due to the inherent limitations of voice recognition software    Read the chart carefully and recognize, using context, where substitutions have occurred

## 2021-08-18 NOTE — RESPIRATORY THERAPY NOTE
08/17/21 2305   Respiratory Assessment   Assessment Type Assess only   General Appearance Sleeping   Respiratory Pattern Tachypneic   Chest Assessment Chest expansion symmetrical   Bilateral Breath Sounds Diminished   Resp Comments decreased fio2 at this time due to SPO2 of 98   O2 Device airvo   Non-Invasive Information   O2 Interface Device HFNC prongs   Non-Invasive Ventilation Mode HFNC (High flow)   $ Pulse Oximetry Spot Check Charge Completed   Non-Invasive Settings   FiO2 (%) 80   Flow (lpm) 60   Temperature (Set) 31   Non-Invasive Readings   Heater Temperature (Obs) 31   Skin Intervention Skin intact   Maintenance   Water bag changed Yes

## 2021-08-18 NOTE — ASSESSMENT & PLAN NOTE
Upgraded to severe pathway upon evaluation  Requiring high-flow nasal cannula to maintain saturations greater than 90%-placed on maximum settings overnight due to desaturations  Code status DNR DNI per patient  COVID Treatment (Severe Pathway):  - Monitor inflammatory markers    D-dimer and CRP markedly elevated, however also in the setting of metastatic disease  - Antibiotics (may be d/c'd if procal normal X2)  - Lipitor 40 mg daily  - Steroids:  dexamethasone 0 1 mg/kg q 12 hours times 10 days  - Out of window for Convalescent Plasma  - Actemra: consider dosing if CRP still elevated today  - Anticoagulation:  Continue heparin gtt  - Self proning as tolerated  - Remdesivir discontinued as now is on severe pathway and is not indicated

## 2021-08-19 NOTE — RESPIRATORY THERAPY NOTE
08/19/21 1616   Non-Invasive Information   O2 Interface Device HFNC prongs   Non-Invasive Ventilation Mode HFNC (High flow)   SpO2 92 %   $ Pulse Oximetry Spot Check Charge Completed   Non-Invasive Settings   FiO2 (%) 55   Flow (lpm) 50   Temperature (Set) 31   Non-Invasive Readings   Heater Temperature (Obs) 31   Skin Intervention Skin intact   Maintenance   Water bag changed No

## 2021-08-19 NOTE — ASSESSMENT & PLAN NOTE
Upgraded to severe pathway upon evaluation  Requiring high-flow nasal cannula to maintain saturations greater than 90%-placed on maximum settings overnight due to desaturations  Code status DNR DNI per patient  COVID Treatment (Severe Pathway):  - Monitor inflammatory markers    D-dimer and CRP markedly elevated, however also in the setting of metastatic disease  - Antibiotics: ceftriaxone and doxycycline  - Lipitor 40 mg daily  - Steroids:  dexamethasone 0 1 mg/kg q 12 hours  x10 days  - Out of window for Convalescent Plasma  - Actemra: consider dosing if CRP still elevated today  - Anticoagulation:  Continue lovenox 1mg/kg q12hr  - Strongly encourage self-proning  - Remdesivir discontinued as now is on severe pathway and is not indicated

## 2021-08-19 NOTE — RESPIRATORY THERAPY NOTE
Titrated fio2 to 55%     08/19/21 1104   Respiratory Assessment   Resp Comments titrated fio2 to 55%   Non-Invasive Information   O2 Interface Device HFNC prongs   Non-Invasive Ventilation Mode HFNC (High flow)   SpO2 97 %   $ Pulse Oximetry Spot Check Charge Completed   Non-Invasive Settings   FiO2 (%) 55   Flow (lpm) 50   Temperature (Set) 31   Non-Invasive Readings   Heater Temperature (Obs) 31   Skin Intervention Skin intact   Maintenance   Water bag changed No

## 2021-08-19 NOTE — RESPIRATORY THERAPY NOTE
08/19/21 0422   Respiratory Assessment   Assessment Type Assess only   General Appearance Sleeping   Respiratory Pattern Shallow; Tachypneic   Chest Assessment Chest expansion symmetrical   Bilateral Breath Sounds Diminished;Clear   Resp Comments ptasleep alejandro well will cont to titrate fio2 to pt comfort    O2 Device hfnc    Non-Invasive Information   O2 Interface Device HFNC prongs   Non-Invasive Ventilation Mode HFNC (High flow)  (AIRVO)   SpO2 97 %   $ Pulse Oximetry Spot Check Charge Completed   Non-Invasive Settings   FiO2 (%) 70   Flow (lpm) 50   Temperature (Set) 31   Non-Invasive Readings   Heater Temperature (Obs) 31   Skin Intervention Skin intact   Maintenance   Water bag changed No

## 2021-08-19 NOTE — RESPIRATORY THERAPY NOTE
08/18/21 2013   Respiratory Assessment   Assessment Type Assess only   General Appearance Alert; Awake   Respiratory Pattern Tachypneic   Chest Assessment Chest expansion symmetrical   Bilateral Breath Sounds Diminished;Clear   Resp Comments pt brushing teeth, no apparent distress, states breathing fine, will cont to montior pt remains on HFNC same settings    O2 Device HFNC airvo    Non-Invasive Information   O2 Interface Device HFNC prongs   Non-Invasive Ventilation Mode HFNC (High flow)  (airvo )   SpO2 (!) 87 %   $ Pulse Oximetry Spot Check Charge Completed   Non-Invasive Settings   FiO2 (%) 83   Flow (lpm) 60   Temperature (Set) 31   Non-Invasive Readings   Heater Temperature (Obs) 31   Skin Intervention Skin intact   Maintenance   Water bag changed Yes

## 2021-08-19 NOTE — RESPIRATORY THERAPY NOTE
08/19/21 0816   Respiratory Assessment   Assessment Type Assess only   General Appearance Awake; Alert   Respiratory Pattern Assisted   Chest Assessment Chest expansion symmetrical   Bilateral Breath Sounds Diminished   Resp Comments Pt remains on HFNC titrated fio2 to 65%   Non-Invasive Information   O2 Interface Device HFNC prongs   Non-Invasive Ventilation Mode HFNC (High flow)   SpO2 95 %   $ Pulse Oximetry Spot Check Charge Completed   Non-Invasive Settings   FiO2 (%) 65   Flow (lpm) 50   Temperature (Set) 31   Non-Invasive Readings   Heater Temperature (Obs) 31   Skin Intervention Skin intact   Maintenance   Water bag changed Yes

## 2021-08-19 NOTE — PROGRESS NOTES
[facility]  Progress Note Rick William 1955, 77 y o  male MRN: [id number]  Unit/Bed#:  Encounter: [id number]  Primary Care Provider: Karel Lucas MD   Date and time admitted to hospital: 8/15/2021 11:55 AM    Severe protein-calorie malnutrition ([address] )  Assessment & Plan  Malnutrition Findings:     albumin less than 2, prominent clavicles       BMI Findings: Body mass index is 18 92 kg/m²  Nutrition consulted    May-Thurner syndrome  Assessment & Plan  Bilateral lower extremity duplexes negative  In the setting of malignancy and COVID-19, places him at significantly higher risk for thrombosis  May alter risk/benefit stratification  Would re-approach patient if needed      Metastatic disease [facility]), 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 primary  Assessment & Plan  Patient with imaging consistent with bony metastasis involving the ribs and pelvis, with likely prostate primary  Underwent biopsy on 08/13, awaiting pathology  Following Urology as outpatient  Planned chemotherapeutic intervention likely to be delayed secondary to COVID-19 pneumonia    Essential hypertension  Assessment & Plan  Continue atenolol at home dosing    Complaint of melena  Assessment & Plan  Patient was told to expect some bleeding for the next 2 weeks secondary to prostatic biopsy performed on 08/13  INR slightly elevated on admission, hemoglobin stable  Currently on therapeutic lovenox dosing  Monitor for any increased bleeding    Alkaline phosphatase elevation  Assessment & Plan  Likely related to bony metastasis  Continue to monitor    * Acute hypoxemic respiratory failure due to COVID-19 [facility])  Assessment & Plan  Upgraded to severe pathway upon evaluation  Requiring high-flow nasal cannula to maintain saturations greater than 90%-placed on maximum settings overnight due to desaturations  Code status DNR DNI per patient  COVID Treatment (Severe Pathway):  - Monitor inflammatory markers    D-dimer and CRP markedly elevated, however also in the setting of metastatic disease  - Antibiotics: ceftriaxone and doxycycline  - Lipitor 40 mg daily  - Steroids:  dexamethasone 0 1 mg/kg q 12 hours  x10 days  - Out of window for Convalescent Plasma  - Actemra: consider dosing if CRP still elevated today  - Anticoagulation:  Continue lovenox 1mg/kg q12hr  - Strongly encourage self-proning  - Remdesivir discontinued as now is on severe pathway and is not indicated      ----------------------------------------------------------------------------------------  HPI/24hr events: Patient unable to tolerate prone position for long periods of time  Remains on HFNC  Patient appropriate for transfer out of the ICU today?: No  Disposition: Continue Stepdown Level 1 level of care   Code Status: Level 3 - DNAR and DNI  ---------------------------------------------------------------------------------------  SUBJECTIVE  Reports that shortness of breath is about the same today as yesterday  Denies cough, chest pain  Denies N/V/D, abdominal pain  Denies dizziness, lightheadedness, headache      Review of Systems  Review of systems was reviewed and negative unless stated above in HPI/24-hour events   ---------------------------------------------------------------------------------------  OBJECTIVE    Vitals   Vitals:    21 2320 21 0300 21 0422   BP:  136/76 132/73    BP Location:  Right arm Right arm    Pulse: 83 86 85 69   Resp: (!) 24 22 (!) 26 21   Temp:  (!) 96 °F (35 6 °C) (!) 96 °F (35 6 °C)    TempSrc:  Oral Axillary    SpO2: (!) 87% 95% 97% 97%   Weight:       Height:         Temp (24hrs), Av 4 °F (35 8 °C), Min:95 4 °F (35 2 °C), Max:97 4 °F (36 3 °C)  Current: Temperature: (!) 96 °F (35 6 °C)          Respiratory:  SpO2: SpO2: 97 %, SpO2 Activity: SpO2 Activity: At Rest, SpO2 Device: O2 Device: High flow nasal cannula  Nasal Cannula O2 Flow Rate (L/min): 6 L/min    Invasive/non-invasive ventilation settings Respiratory    Lab Data (Last 4 hours)    None         O2/Vent Data (Last 4 hours)      08/19 0422          Non-Invasive Ventilation Mode HFNC (High flow)  Comment: AIRVO                   Physical Exam  Vitals and nursing note reviewed  Constitutional:       Appearance: He is well-developed  HENT:      Head: Normocephalic and atraumatic  Nose:      Comments: HFNC  Eyes:      Conjunctiva/sclera: Conjunctivae normal    Cardiovascular:      Rate and Rhythm: Normal rate and regular rhythm  Heart sounds: No murmur heard  Pulmonary:      Effort: Pulmonary effort is normal  No respiratory distress  Breath sounds: No rales  Comments: Breath sounds diminished  Abdominal:      General: There is no distension  Palpations: Abdomen is soft  Tenderness: There is no abdominal tenderness  Musculoskeletal:      Cervical back: Neck supple  Right lower leg: No edema  Left lower leg: No edema  Skin:     General: Skin is warm and dry  Neurological:      Mental Status: He is alert           Laboratory and Diagnostics:  Results from last 7 days   Lab Units 08/18/21  0446 08/17/21  0520 08/16/21  1143 08/16/21  0443 08/15/21  1230   WBC Thousand/uL 13 59* 16 40* 11 37* 10 95* 14 31*   HEMOGLOBIN g/dL 12 5 12 2 11 7* 12 2 14 5   HEMATOCRIT % 38 0 36 6 36 4* 38 1 44 6   PLATELETS Thousands/uL 279 254 228 232 251   NEUTROS PCT % 88* 89*  --   --  91*   MONOS PCT % 5 4  --   --  4   MONO PCT %  --   --   --  4  --      Results from last 7 days   Lab Units 08/18/21  0446 08/17/21  0520 08/16/21  0443 08/15/21  1230   SODIUM mmol/L 143 143 145 145   POTASSIUM mmol/L 4 1 3 8 3 8 3 6   CHLORIDE mmol/L 111* 111* 112* 107   CO2 mmol/L 21 19* 23 26   ANION GAP mmol/L 11 13 10 12   BUN mg/dL 32* 32* 32* 35*   CREATININE mg/dL 0 94 0 83 0 91 1 24   CALCIUM mg/dL 7 4* 7 1* 7 1* 7 6*   GLUCOSE RANDOM mg/dL 121 138 124 130   ALT U/L 88* 89* 101* 84*   AST U/L 53* 59* 123* 80*   ALK PHOS U/L 317* 301* 328* 353*   ALBUMIN g/dL 1 6* 1 4* 1 5* 1 8*   TOTAL BILIRUBIN mg/dL 0 46 0 39 0 58 0 81     Results from last 7 days   Lab Units 08/18/21  0446 08/17/21  0520 08/16/21  0443   MAGNESIUM mg/dL 2 1 2 2 2 4   PHOSPHORUS mg/dL 3 0 2 5 3 1      Results from last 7 days   Lab Units 08/18/21  0033 08/17/21  1636 08/17/21  0844 08/17/21  0108 08/16/21  1822 08/16/21  1143 08/16/21  0443 08/15/21  1230   INR   --   --   --   --   --  1 54* 1 48* 1 57*   PTT seconds 102* 77* 50* 50* 44* 27  --  27      Results from last 7 days   Lab Units 08/15/21  1230   TROPONIN I ng/mL <0 02         ABG:    VBG:    Results from last 7 days   Lab Units 08/18/21  0446 08/17/21  1513 08/16/21  0443 08/15/21  1704   PROCALCITONIN ng/ml 0 19 0 25 0 76* 1 03*       Micro  Results from last 7 days   Lab Units 08/16/21  1156 08/15/21  1232 08/15/21  1231   BLOOD CULTURE   --  No Growth at 72 hrs  No Growth at 72 hrs  MRSA CULTURE ONLY  No Methicillin Resistant Staphlyococcus aureus (MRSA) isolated  --   --        Imaging: I have personally reviewed pertinent reports  and I have personally reviewed pertinent films in PACS    Intake and Output  I/O       08/17 0701 - 08/18 0700 08/18 0701 - 08/19 0700    P  O  480 980    I V  (mL/kg) 379 5 (6 5) 111 5 (1 9)    IV Piggyback 50 50    Total Intake(mL/kg) 909 5 (15 7) 1141 5 (19 6)    Urine (mL/kg/hr) 675 (0 5) 1675 (1 2)    Total Output 675 1675    Net +234 5 -533 5                Height and Weights   Height: 5' 9" (175 3 cm)  IBW (Ideal Body Weight): 70 7 kg  Body mass index is 18 92 kg/m²  Weight (last 2 days)     None            Nutrition       Diet Orders   (From admission, onward)             Start     Ordered    08/15/21 1611  Diet Regular; Regular House  Diet effective now     Question Answer Comment   Diet Type Regular    Regular Regular House    RD to adjust diet per protocol?  Yes        08/15/21 1610                  Active Medications  Scheduled Meds:  Current Facility-Administered Medications   Medication Dose Route Frequency Provider Last Rate    acetaminophen  650 mg Oral Q6H PRN Rachael Kulkarni PA-C      atenolol  25 mg Oral Daily Rachael Kulkarni PA-C      atorvastatin  40 mg Oral Daily With Dinner Rachael Kulkarni PA-C      cefTRIAXone  1,000 mg Intravenous Q24H Leah ESCOBEDO MD 1,000 mg (08/18/21 1244)    dexamethasone  0 1 mg/kg Intravenous Q12H Michael MEHUL Maxwell      doxycycline hyclate  100 mg Oral Q12H [facility] MEHUL Maxwell      enoxaparin  1 mg/kg Subcutaneous Q12H [facility] Notus PattMEHUL ulcio      ondansetron  4 mg Intravenous Q6H PRN Rachael Kulkarni PA-C      pantoprazole  20 mg Oral Early Morning Rachael Kulkarni PA-C      polyethylene glycol  17 g Oral Daily PRN Rachael Kulkarni PA-C       Continuous Infusions:     PRN Meds:   acetaminophen, 650 mg, Q6H PRN  ondansetron, 4 mg, Q6H PRN  polyethylene glycol, 17 g, Daily PRN        Invasive Devices Review  Invasive Devices     Peripheral Intravenous Line            Peripheral IV Left;Ventral (anterior) Forearm -- days    Peripheral IV 08/16/21 Proximal;Right;Ventral (anterior) Forearm 2 days                Rationale for remaining devices: n/a  ---------------------------------------------------------------------------------------  Advance Directive and Living Will:      Power of :    POLST:    ---------------------------------------------------------------------------------------  Care Time Delivered:   No Critical Care time spent       Dannie Lara PA-C      Portions of the record may have been created with voice recognition software  Occasional wrong word or "sound a like" substitutions may have occurred due to the inherent limitations of voice recognition software    Read the chart carefully and recognize, using context, where substitutions have occurred

## 2021-08-19 NOTE — RESPIRATORY THERAPY NOTE
08/18/21 2320   Respiratory Assessment   Assessment Type Assess only   General Appearance Sleeping   Respiratory Pattern Tachypneic   Chest Assessment Chest expansion symmetrical   Bilateral Breath Sounds Diminished;Clear   Cough Congested;Productive;Moist   Resp Comments pt asleep alejandro well will cont to monitor and titrate fio2 as able    O2 Device HFNC    Non-Invasive Information   O2 Interface Device HFNC prongs   Non-Invasive Ventilation Mode HFNC (High flow)  (airvo )   SpO2 95 %   $ Pulse Oximetry Spot Check Charge Completed   Non-Invasive Settings   FiO2 (%) 83  (found on will cont )   Flow (lpm) 60   Temperature (Set) 31   Non-Invasive Readings   Heater Temperature (Obs) 31   Skin Intervention Skin intact   Maintenance   Water bag changed Yes

## 2021-08-19 NOTE — ASSESSMENT & PLAN NOTE
Patient was told to expect some bleeding for the next 2 weeks secondary to prostatic biopsy performed on 08/13  INR slightly elevated on admission, hemoglobin stable  Currently on therapeutic lovenox dosing  Monitor for any increased bleeding

## 2021-08-20 NOTE — RESPIRATORY THERAPY NOTE
08/20/21 1348   Respiratory Assessment   Assessment Type Assess only   General Appearance Alert; Awake   Respiratory Pattern Dyspnea with exertion;Dyspnea at rest   Chest Assessment Chest expansion symmetrical   Bilateral Breath Sounds Diminished   Resp Comments placed pt on bipap   Non-Invasive Information   O2 Interface Device Face mask   Non-Invasive Ventilation Mode BiPAP   $ Continous NIV Subsequent   SpO2 94 %   $ Pulse Oximetry Spot Check Charge Completed   Non-Invasive Settings   FiO2 (%) 100   Temperature (Set) 31   IPAP (cm) 12 cm   EPAP (cm) 6 cm   Rate (Set) 8   Pressure Support (cm H2O) 6   Rise Time 2   Inspiratory Time (Set) 1   Non-Invasive Readings   Heater Temperature (Obs) 31   Skin Intervention Skin intact   Total Rate 54   Vt (mL) (Mech) 703   MV (Mech) 35 6   Peak Pressure (Obs) 18   Spontaneous Vt (mL) 655   Leak (lpm) 0   Maintenance   Water bag changed No

## 2021-08-20 NOTE — RESPIRATORY THERAPY NOTE
08/20/21 1553   Non-Invasive Information   O2 Interface Device HFNC prongs   Non-Invasive Ventilation Mode HFNC (High flow)   SpO2 95 %   $ Pulse Oximetry Spot Check Charge Completed   Non-Invasive Settings   FiO2 (%) 95   Flow (lpm) 60

## 2021-08-20 NOTE — TELEPHONE ENCOUNTER
Pt positive for Covid 8/15/21  Had bx w Dr Kristen Jamison 8/13/21 in [address]  Appt for 8/23/21 biopsy results has been cancelled  - pt hospitalized in ICU     Please f/u upon discharge to reschedule appt for bx results

## 2021-08-20 NOTE — RESPIRATORY THERAPY NOTE
08/20/21 1942   Respiratory Assessment   Assessment Type Assess only   General Appearance Awake   Respiratory Pattern Tachypneic;Dyspnea with exertion;Dyspnea at rest;Accessory muscle use   Chest Assessment Chest expansion symmetrical   Bilateral Breath Sounds Diminished   Resp Comments pt remains on HFNC with maxed out settings, NRB available at bedside  Continue with current settings      O2 Device Airvo   Non-Invasive Information   O2 Interface Device HFNC prongs   Non-Invasive Ventilation Mode HFNC (High flow)   $ Pulse Oximetry Spot Check Charge Completed   Non-Invasive Settings   FiO2 (%) 94   Flow (lpm) 60   Temperature (Set) 31   Non-Invasive Readings   Heater Temperature (Obs) 31   Skin Intervention Skin intact   Maintenance   Water bag changed Yes

## 2021-08-20 NOTE — RESPIRATORY THERAPY NOTE
08/20/21 0333   Respiratory Assessment   Assessment Type Assess only   General Appearance Sleeping   Respiratory Pattern Assisted; Tachypneic   Chest Assessment Chest expansion symmetrical   Bilateral Breath Sounds Diminished   Resp Comments pt remains on HFNC  no changes at this time   Non-Invasive Information   O2 Interface Device HFNC prongs   Non-Invasive Ventilation Mode HFNC (High flow)   SpO2 96 %   $ Pulse Oximetry Spot Check Charge Completed   Non-Invasive Settings   FiO2 (%) 55   Flow (lpm) 50   Temperature (Set) 31   Non-Invasive Readings   Heater Temperature (Obs) 31   Skin Intervention Skin intact   Total Rate 33   Maintenance   Water bag changed No

## 2021-08-20 NOTE — QUICK NOTE
Updated patient's wife Cuate Pierce regarding his clinical status  Made aware that he is declining and requiring increasing support and oxygenation, but he was unable to tolerate bipap even with anxiolytics  Also made aware that since he was a level 3 code status there were no additional options for escalation  Cuate Pierce will facetime with patient so she can see him and attempt to have a conversation with him

## 2021-08-20 NOTE — RESPIRATORY THERAPY NOTE
08/20/21 0824   Respiratory Assessment   Assessment Type Assess only   General Appearance Alert   Respiratory Pattern Dyspnea with exertion   Chest Assessment Chest expansion symmetrical   Bilateral Breath Sounds Diminished   Resp Comments pt  desat while moving to the chair   increased to 95% and 60L with NRB   Non-Invasive Information   O2 Interface Device HFNC prongs   Non-Invasive Ventilation Mode HFNC (High flow)   SpO2 (!) 86 %   $ Pulse Oximetry Spot Check Charge Completed   Non-Invasive Settings   FiO2 (%) 90   Flow (lpm) 60   Temperature (Set) 31   Non-Invasive Readings   Heater Temperature (Obs) 31   Skin Intervention Skin intact   Maintenance   Water bag changed No

## 2021-08-20 NOTE — PROGRESS NOTES
[address]  Progress Note Brooke Mann 1955, 77 y o  male MRN: [id number]  Unit/Bed#:  Encounter: [id number]  Primary Care Provider: Naz Sarmiento MD   Date and time admitted to hospital: 8/15/2021 11:55 AM    Severe protein-calorie malnutrition ([address] )  Assessment & Plan  Malnutrition Findings:     albumin less than 2, prominent clavicles       BMI Findings: Body mass index is 18 92 kg/m²  Nutrition consulted    May-Thurner syndrome  Assessment & Plan  Bilateral lower extremity duplexes negative  In the setting of malignancy and COVID-19, places him at significantly higher risk for thrombosis  May alter risk/benefit stratification  Would re-approach patient if needed      Metastatic disease [facility]), 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 primary  Assessment & Plan  Patient with imaging consistent with bony metastasis involving the ribs and pelvis, with likely prostate primary  Underwent biopsy on 08/13, awaiting pathology  Following Urology as outpatient  Planned chemotherapeutic intervention likely to be delayed secondary to COVID-19 pneumonia    Essential hypertension  Assessment & Plan  Continue atenolol at home dosing    Complaint of melena  Assessment & Plan  Patient was told to expect some bleeding for the next 2 weeks secondary to prostatic biopsy performed on 08/13  INR slightly elevated on admission, hemoglobin stable  Currently on therapeutic lovenox dosing  Monitor for any increased bleeding    Alkaline phosphatase elevation  Assessment & Plan  Likely related to bony metastasis  Continue to monitor    * Acute hypoxemic respiratory failure due to COVID-19 [facility])  Assessment & Plan  Upgraded to severe pathway upon evaluation  Requiring high-flow nasal cannula to maintain saturations greater than 90%-placed on maximum settings overnight due to desaturations  Code status DNR DNI per patient  COVID Treatment (Severe Pathway):  - Monitor inflammatory markers    D-dimer and CRP markedly elevated, however also in the setting of metastatic disease  - Antibiotics: ceftriaxone and doxycycline  - Lipitor 40 mg daily  - Steroids:  dexamethasone 0 1 mg/kg q 12 hours  x10 days  - Out of window for Convalescent Plasma  - Actemra: consider dosing if CRP still elevated today  - Anticoagulation:  Continue lovenox 1mg/kg q12hr  - Strongly encourage self-proning  - Remdesivir discontinued as now is on severe pathway and is not indicated      ----------------------------------------------------------------------------------------  HPI/24hr events: Tolerated BiPAP until around 00:30 and switched to HFNC  Patient appropriate for transfer out of the ICU today?: No  Disposition: Continue Stepdown Level 1 level of care   Code Status: Level 3 - DNAR and DNI  ---------------------------------------------------------------------------------------  SUBJECTIVE  "My breathing is smooth this morning"  Reports overall improvement in shortness of breath  Denies chest pain or palpitations  Denies abdominal pain, NVD       Review of Systems  Review of systems was reviewed and negative unless stated above in HPI/24-hour events   ---------------------------------------------------------------------------------------  OBJECTIVE    Vitals   Vitals:    21 0021 21 0127 21 0258 21 0333   BP:   109/57    BP Location:   Right arm    Pulse:   80    Resp:   (!) 25    Temp:   (!) 96 4 °F (35 8 °C)    TempSrc:   Axillary    SpO2: 96% 94% 97% 96%   Weight:       Height:         Temp (24hrs), Av 4 °F (35 8 °C), Min:96 3 °F (35 7 °C), Max:96 6 °F (35 9 °C)  Current: Temperature: (!) 96 4 °F (35 8 °C)          Respiratory:  SpO2: SpO2: 96 %, SpO2 Activity: SpO2 Activity: At Rest, SpO2 Device: O2 Device: High flow nasal cannula  Nasal Cannula O2 Flow Rate (L/min): 6 L/min    Invasive/non-invasive ventilation settings   Respiratory    Lab Data (Last 4 hours)    None         O2/Vent Data (Last 4 hours)       5979 Non-Invasive Ventilation Mode HFNC (High flow)                   Physical Exam  Vitals and nursing note reviewed  Constitutional:       Appearance: He is well-developed  HENT:      Head: Normocephalic and atraumatic  Nose:      Comments: HFNC  Eyes:      Conjunctiva/sclera: Conjunctivae normal    Cardiovascular:      Rate and Rhythm: Normal rate and regular rhythm  Heart sounds: No murmur heard  Pulmonary:      Effort: Pulmonary effort is normal  No respiratory distress  Breath sounds: No rales  Comments: Breath sounds diminished at bilateral bases  Abdominal:      General: There is no distension  Palpations: Abdomen is soft  Tenderness: There is no abdominal tenderness  Musculoskeletal:      Cervical back: Neck supple  Right lower leg: No edema  Left lower leg: No edema  Skin:     General: Skin is warm and dry  Neurological:      Mental Status: He is alert and oriented to person, place, and time           Laboratory and Diagnostics:  Results from last 7 days   Lab Units 08/19/21  0512 08/18/21  0446 08/17/21  0520 08/16/21  1143 08/16/21  0443 08/15/21  1230   WBC Thousand/uL 11 55* 13 59* 16 40* 11 37* 10 95* 14 31*   HEMOGLOBIN g/dL 12 1 12 5 12 2 11 7* 12 2 14 5   HEMATOCRIT % 37 0 38 0 36 6 36 4* 38 1 44 6   PLATELETS Thousands/uL 269 279 254 228 232 251   NEUTROS PCT %  --  88* 89*  --   --  91*   MONOS PCT %  --  5 4  --   --  4   MONO PCT % 3*  --   --   --  4  --      Results from last 7 days   Lab Units 08/19/21  0512 08/18/21  0446 08/17/21  0520 08/16/21  0443 08/15/21  1230   SODIUM mmol/L 141 143 143 145 145   POTASSIUM mmol/L 4 4 4 1 3 8 3 8 3 6   CHLORIDE mmol/L 110* 111* 111* 112* 107   CO2 mmol/L 20* 21 19* 23 26   ANION GAP mmol/L 11 11 13 10 12   BUN mg/dL 30* 32* 32* 32* 35*   CREATININE mg/dL 0 85 0 94 0 83 0 91 1 24   CALCIUM mg/dL 7 4* 7 4* 7 1* 7 1* 7 6*   GLUCOSE RANDOM mg/dL 111 121 138 124 130   ALT U/L  --  88* 89* 101* 84* AST U/L  --  53* 59* 123* 80*   ALK PHOS U/L  --  317* 301* 328* 353*   ALBUMIN g/dL  --  1 6* 1 4* 1 5* 1 8*   TOTAL BILIRUBIN mg/dL  --  0 46 0 39 0 58 0 81     Results from last 7 days   Lab Units 08/19/21  0512 08/18/21  0446 08/17/21  0520 08/16/21  0443   MAGNESIUM mg/dL  --  2 1 2 2 2 4   PHOSPHORUS mg/dL 2 9 3 0 2 5 3 1      Results from last 7 days   Lab Units 08/19/21  0512 08/18/21  0033 08/17/21  1636 08/17/21  0844 08/17/21  0108 08/16/21  1822 08/16/21  1143 08/16/21  0443 08/15/21  1230   INR   --   --   --   --   --   --  1 54* 1 48* 1 57*   PTT seconds 32 102* 77* 50* 50* 44* 27  --  27      Results from last 7 days   Lab Units 08/15/21  1230   TROPONIN I ng/mL <0 02         ABG:    VBG:    Results from last 7 days   Lab Units 08/18/21  0446 08/17/21  1513 08/16/21  0443 08/15/21  1704   PROCALCITONIN ng/ml 0 19 0 25 0 76* 1 03*       Micro  Results from last 7 days   Lab Units 08/16/21  1156 08/15/21  1232 08/15/21  1231   BLOOD CULTURE   --  No Growth After 4 Days  No Growth After 4 Days  MRSA CULTURE ONLY  No Methicillin Resistant Staphlyococcus aureus (MRSA) isolated  --   --        Imaging: I have personally reviewed pertinent reports  and I have personally reviewed pertinent films in PACS    Intake and Output  I/O       08/18 0701 - 08/19 0700 08/19 0701 - 08/20 0700    P  O  980     I V  (mL/kg) 111 5 (1 9)     IV Piggyback 50     Total Intake(mL/kg) 1141 5 (19 6)     Urine (mL/kg/hr) 1850 (1 3) 275 (0 2)    Total Output 1850 275    Net -708 5 -275                Height and Weights   Height: 5' 9" (175 3 cm)  IBW (Ideal Body Weight): 70 7 kg  Body mass index is 18 92 kg/m²  Weight (last 2 days)     None            Nutrition       Diet Orders   (From admission, onward)             Start     Ordered    08/15/21 1611  Diet Regular; Regular House  Diet effective now     Question Answer Comment   Diet Type Regular    Regular Regular House    RD to adjust diet per protocol?  Yes 08/15/21 1610                  Active Medications  Scheduled Meds:  Current Facility-Administered Medications   Medication Dose Route Frequency Provider Last Rate    acetaminophen  650 mg Oral Q6H PRN Uriel Harrison PA-C      amLODIPine  5 mg Oral Daily MEHUL Garcia      atenolol  25 mg Oral Daily Uriel Harrison PA-C      atorvastatin  40 mg Oral Daily With Dinner Uriel Harrison PA-C      cefTRIAXone  1,000 mg Intravenous Q24H Rubia ESCOBEDO MD 1,000 mg (08/19/21 1256)    dexamethasone  0 1 mg/kg Intravenous Q12H MEHUL Rodriguez      doxycycline hyclate  100 mg Oral Q12H [address] [place] MEHUL Vallecillo      enoxaparin  1 mg/kg Subcutaneous Q12H [address] [place], MEHUL      ondansetron  4 mg Intravenous Q6H PRN Uriel Harrison PA-C      pantoprazole  20 mg Oral Early Morning Uriel Harrison PA-C      polyethylene glycol  17 g Oral Daily PRN Uriel Harrison PA-C       Continuous Infusions:     PRN Meds:   acetaminophen, 650 mg, Q6H PRN  ondansetron, 4 mg, Q6H PRN  polyethylene glycol, 17 g, Daily PRN        Invasive Devices Review  Invasive Devices     Peripheral Intravenous Line            Peripheral IV 08/16/21 Proximal;Right;Ventral (anterior) Forearm 3 days                Rationale for remaining devices: n/a  ---------------------------------------------------------------------------------------  Advance Directive and Living Will:      Power of :    POLST:    ---------------------------------------------------------------------------------------  Care Time Delivered:   No Critical Care time spent       Liz Wilson PA-C      Portions of the record may have been created with voice recognition software  Occasional wrong word or "sound a like" substitutions may have occurred due to the inherent limitations of voice recognition software    Read the chart carefully and recognize, using context, where substitutions have occurred

## 2021-08-21 PROBLEM — Z51.5 COMFORT MEASURES ONLY STATUS: Status: ACTIVE | Noted: 2021-01-01

## 2021-08-21 NOTE — MALNUTRITION/BMI
This medical record reflects one or more clinical indicators suggestive of malnutrition and/or morbid obesity  Severe acute illness PCM r/t muscle wasting, fat loss and significant weight loss as evidenced by some protrusion of clavicle and acromion process and significant 26#/16 8% wt loss x1 month  Tx not indicated d/t comfort measures only status at this time  Malnutrition Findings:   Adult Malnutrition type: Acute illness  Adult Degree of Malnutrition: Other severe protein calorie malnutrition  Malnutrition Characteristics: Fat loss, Muscle loss, Weight loss    BMI Findings: Body mass index is 18 92 kg/m²  See Nutrition note dated 8/21/21 for additional details  Completed nutrition assessment is viewable in the nutrition documentation

## 2021-08-21 NOTE — ASSESSMENT & PLAN NOTE
Malnutrition Findings:     albumin less than 2, prominent clavicles       BMI Findings: Body mass index is 18 92 kg/m²  Unable to take p o   Secondary to altered level of consciousness

## 2021-08-21 NOTE — ASSESSMENT & PLAN NOTE
Discontinued meds due to decreased level of consciousness  Goals of care transitioned to comfort measures

## 2021-08-21 NOTE — ASSESSMENT & PLAN NOTE
Malnutrition Findings:   Adult Malnutrition type: Acute illness albumin less than 2, prominent clavicles  Adult Degree of Malnutrition: Other severe protein calorie malnutrition    BMI Findings: Body mass index is 18 92 kg/m²  Unable to take p o   Secondary to altered level of consciousness

## 2021-08-21 NOTE — PLAN OF CARE
Comfort measures only noted  Please consult nutrition services in event of change of status  See malnutrition note for details

## 2021-08-21 NOTE — CASE MANAGEMENT
Case Management Progress Note    Patient name Alex Class  Location / MRN [id number]  : 1955 Date 2021       LOS (days): 6  Geometric Mean LOS (GMLOS) (days): 4 80  Days to GMLOS:-1 2        BUNDLE:      OBJECTIVE:  Pt is a 77y o  year old /Civil Union, white or  [1], male with Yazdanism preference of None admitted on 8/15/2021 11:55 AM  Pt is admitted to  at [address] [place] [address] with complaints of Acute hypoxemic respiratory failure due to COVID-19 [facility])   Current admission status: Inpatient  Preferred Pharmacy:   [place] [address]  Phone: [phone] Fax: [phone]    Primary Care Provider: María Benoit MD    Primary Insurance: [facility]  Secondary Insurance:     PROGRESS NOTE:  Cm was consulted for hospice  CM called family Arnulfo Poag [phone]  She states that the pt may need a placement would be better because the pt wife is an immigrant and does not speak any [place]  They do not know who is the POA  Arnulfo Poag is the pt cousin and she explained that the wife will make the decsions but she only speaks [place]  Onita Poag states that the pt wife states that the pt pw is in the safe as far as she can understand, but the pt wife does not have the combination and does not know who the  is that completed his important pw  The pt wife will need to speak with an  in order to gather information about the pt final wishes  Onita Poag states that she would not be able to sign any pw and is other relatives do not live in the area  CM informed her that there are phones with interpreters in the hospital and the pt wife is able to come to the hospital   CM explained that she would not be able to move forward with the hospice referral since Onita Poag is not able to sign any pw  She stated that she understood    CM informed her that the regular CM will be able to reach out to her on Monday  Sarabjit Gallagher will assist in coordinating getting someone to transport the wife to the hospital to begin the process and discuss what will be needed for hospice  Sarabjit Gallagher thinks that home hospice may be too much for the pt wife and with her limited English that she may not be able to ask for the assistance she might need

## 2021-08-21 NOTE — ASSESSMENT & PLAN NOTE
Patient's goals of care transition to comfort measures due to continued deterioration per his stated wishes on admission  Dilaudid for pain/dyspnea  Ativan for agitation/anxiety

## 2021-08-21 NOTE — ASSESSMENT & PLAN NOTE
Patient with imaging consistent with bony metastasis involving the ribs and pelvis, with likely prostate primary    Underwent biopsy on 08/13, pathology positive for prostatic adenocarcinoma

## 2021-08-21 NOTE — ASSESSMENT & PLAN NOTE
Patient was told to expect some bleeding for the next 2 weeks secondary to prostatic biopsy performed on 08/13  INR slightly elevated on admission, hemoglobin stable  Discontinued anticoagulants in favor of comfort measures

## 2021-08-21 NOTE — QUICK NOTE
Called and spoke with patient's cousin Ronny Herrera via phone and wife Ambar Linares via Done.  phone ([address]) to provide daily clinical update  All questions and concerns answered at that time         Polo Hale PA-C

## 2021-08-21 NOTE — CASE MANAGEMENT
Case Management Progress Note    Patient name Alejandra Hutson  Location / MRN [id number]  : 1955 Date 2021       LOS (days): 6  Geometric Mean LOS (GMLOS) (days): 4 80  Days to GMLOS:-1 4        BUNDLE:      OBJECTIVE:  Pt is a 77y o  year old /Civil Union, white or  [1], male with Orthodox preference of None admitted on 8/15/2021 11:55 AM  Pt is admitted to  at [address] [place] [address] with complaints of Acute hypoxemic respiratory failure due to COVID-19 [facility])   Current admission status: Inpatient  Preferred Pharmacy:   [place] [address]  Phone: [phone] Fax: [phone]    Primary Care Provider: Yvon Jorgensen MD    [facility]  Secondary Insurance:     PROGRESS NOTE:    Cm reached out to ICU to provide an update on the hospice referral   CM stated that the pt cousin would not be able to be the decision maker  CM also informed ICU that the pt wife would need to come in to sign pw and it was explained to the CM that she is COVID positive  CM explained that someone would need to communicate with the pt wife, have pw signed and the facility will need to have financial information regarding the pt being placed at their facility  Cm is not certain the pt wife will have all of the necessary information    The ICU staff Mansoor Huber expressed some urgency in the pt gaining hospice care and the CM asked if the pt was GIP appropriate  ICU believes that he may be GIP appropriate

## 2021-08-21 NOTE — ASSESSMENT & PLAN NOTE
Patient's goals of care transition to comfort measures due to continued deterioration last evening  Dilaudid for pain/dyspnea  Ativan for agitation/anxiety

## 2021-08-21 NOTE — PROGRESS NOTES
Critical Care Services- Interval Progress Note   Philippe Truong 77 y o  male MRN: [id number]  Unit/Bed#:  Encounter: [id number]  Assessment and Plan  Patient becoming increasingly confused and having more difficulty breathing  Tachypneic with retractions and desaturating into the 70s despite high-flow nasal cannula  Patient does not tolerate BiPAP  Patient appears panicked at times  He is confused and impulsive  Based on the conversation I had with this patient when he was lucid on admission, he was very clear that he did not want intubation or resuscitation  He also commented that in the event of severe dyspnea were "struggling to breathe" (his words) he would wish to be made comfortable at that time  Given the current state of his deterioration, his goals of care should be transitioned to that of comfort  Called to update his wife, who seems to be unable to comprehend patient's current condition  She has asked patient's cousin Rick Carosn to contact me for a better understanding and a plan  I speak with Rick Carson at length, who states that we should proceed with comfort measures as Tahir directed on admission  Given patient's wife is ill with COVID herself, both request that I speak to Rick Carson for any updates or changes in his condition  Patient made code level 4      Diagnosis:  Acute hypoxemic respiratory failure secondary to COVID-19  o Plan:  Moved to comfort measures  Dilaudid for pain/dyspnea, Ativan for anxiety/agitation      Upon my evaluation, this patient had a high probability of imminent or life-threatening deterioration due to Acute hypoxemic respiratory failure secondary to COVID-19 with associated mental status and acute desaturation, which required my direct attention, intervention, and personal management       I have personally provided 30 minutes ([phone] on 08/20/21   of critical care time, exclusive of procedures, teaching, family meetings, and any prior time recorded by providers other than myself  Time includes review of laboratory data, review of imaging/radiology results, monitoring for potential decompensation  Interventions were performed as documented above  Discussed with Dr Mendez Hands  -------------------------------------------------------------------------------------------------------------------------------------  Hemodynamic Monitoring:  Vital Signs:   Blood pressure 119/66, pulse (!) 114, temperature 98 1 °F (36 7 °C), temperature source Axillary, resp  rate 19, height 5' 9" (1 753 m), weight 58 1 kg (128 lb 1 4 oz), SpO2 97 %          Laboratory   Results from last 7 days   Lab Units 08/20/21 0611 08/19/21 0512 08/18/21 0446 08/17/21  0520 08/16/21  1143 08/16/21  0443 08/15/21  1230   WBC Thousand/uL 12 31* 11 55* 13 59* 16 40* 11 37* 10 95* 14 31*   HEMOGLOBIN g/dL 13 4 12 1 12 5 12 2 11 7* 12 2 14 5   HEMATOCRIT % 39 9 37 0 38 0 36 6 36 4* 38 1 44 6   PLATELETS Thousands/uL 272 269 279 254 228 232 251   NEUTROS PCT % 83*  --  88* 89*  --   --  91*   MONOS PCT % 6  --  5 4  --   --  4   MONO PCT %  --  3*  --   --   --  4  --      Results from last 7 days   Lab Units 08/20/21 0611 08/19/21 0512 08/18/21 0446 08/17/21  0520 08/16/21  0443 08/15/21  1230   SODIUM mmol/L 138 141 143 143 145 145   POTASSIUM mmol/L 4 3 4 4 4 1 3 8 3 8 3 6   CHLORIDE mmol/L 108 110* 111* 111* 112* 107   CO2 mmol/L 21 20* 21 19* 23 26   ANION GAP mmol/L 9 11 11 13 10 12   BUN mg/dL 26* 30* 32* 32* 32* 35*   CREATININE mg/dL 0 77 0 85 0 94 0 83 0 91 1 24   CALCIUM mg/dL 7 4* 7 4* 7 4* 7 1* 7 1* 7 6*   GLUCOSE RANDOM mg/dL 98 111 121 138 124 130   ALT U/L 87*  --  88* 89* 101* 84*   AST U/L 51*  --  53* 59* 123* 80*   ALK PHOS U/L 318*  --  317* 301* 328* 353*   ALBUMIN g/dL 1 9*  --  1 6* 1 4* 1 5* 1 8*   TOTAL BILIRUBIN mg/dL 0 45  --  0 46 0 39 0 58 0 81     Results from last 7 days   Lab Units 08/20/21  0611 08/19/21  0512 08/18/21  0446 08/17/21  0520 08/16/21  0443   MAGNESIUM mg/dL  -- --  2 1 2 2 2 4   PHOSPHORUS mg/dL 2 5 2 9 3 0 2 5 3 1      Results from last 7 days   Lab Units 08/19/21  0512 08/18/21  0033 08/17/21  1636 08/17/21  0844 08/17/21  0108 08/16/21  1822 08/16/21  1143 08/16/21  0443 08/15/21  1230   INR   --   --   --   --   --   --  1 54* 1 48* 1 57*   PTT seconds 32 102* 77* 50* 50* 44* 27  --  27      Results from last 7 days   Lab Units 08/15/21  1230   TROPONIN I ng/mL <0 02         ABG:    VBG:    Results from last 7 days   Lab Units 08/18/21  0446 08/17/21  1513 08/16/21  0443 08/15/21  1704   PROCALCITONIN ng/ml 0 19 0 25 0 76* 1 03*       Micro  Results from last 7 days   Lab Units 08/16/21  1156 08/15/21  1232 08/15/21  1231   BLOOD CULTURE   --  No Growth After 4 Days  No Growth After 4 Days  MRSA CULTURE ONLY  No Methicillin Resistant Staphlyococcus aureus (MRSA) isolated  --   --        Diagnostic Imaging / Data: I have personally reviewed pertinent reports  and I have personally reviewed pertinent films in PACS    Medications:  Current Facility-Administered Medications   Medication Dose Route Frequency    HYDROmorphone (DILAUDID) injection 1 mg  1 mg Intravenous Q1H PRN    LORazepam (ATIVAN) injection 0 5 mg  0 5 mg Intravenous Q1H PRN    OLANZapine (ZyPREXA ZYDIS) dispersible tablet 5 mg  5 mg Oral Q12H    ondansetron (ZOFRAN) injection 4 mg  4 mg Intravenous Q6H PRN       SIGNATURE: MEHUL Chatterjee    Portions of the record may have been created with voice recognition software  Occasional wrong word or "sound a like" substitutions may have occurred due to the inherent limitations of voice recognition software    Read the chart carefully and recognize, using context, where substitutions have occurred

## 2021-08-22 NOTE — PROGRESS NOTES
Critical Care Medicine:  Significant Event  [place]  /  08/22/21  0030  Pt tachycardic and gasping, appears uncomfortable  Dilaudid and ativan increased for comfort  Emotional support to pt despite unresponsive  Blood pressure 113/79, pulse (!) 147, temperature 98 1 °F (36 7 °C), temperature source Axillary, resp  rate (!) 26, height 5' 9" (1 753 m), weight 58 1 kg (128 lb 1 4 oz), SpO2 61 %          MEHUL Chatterjee

## 2021-08-22 NOTE — PROGRESS NOTES
[address] [place] [address]  Progress Note Bhavesh Iniguez 1955, 77 y o  male MRN: [id number]  Unit/Bed#:  Encounter: [id number]  Primary Care Provider: Alessandro Jeffrey MD   Date and time admitted to hospital: 8/15/2021 11:55 AM    Comfort measures only status  Assessment & Plan  Supportive care  Code level 4    Severe protein-calorie malnutrition ([facility] [address] )  Assessment & Plan  Malnutrition Findings:   Adult Malnutrition type: Acute illness albumin less than 2, prominent clavicles  Adult Degree of Malnutrition: Other severe protein calorie malnutrition    BMI Findings: Body mass index is 18 92 kg/m²  Unable to take p o  Secondary to altered level of consciousness    May-Thurner syndrome  Assessment & Plan  Bilateral lower extremity duplexes negative        Metastatic disease ([facility] [address] ), 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 primary  Assessment & Plan  Patient with imaging consistent with bony metastasis involving the ribs and pelvis, with likely prostate primary    Underwent biopsy on 08/13, pathology positive for prostatic adenocarcinoma      Essential hypertension  Assessment & Plan  Discontinued meds due to decreased level of consciousness  Goals of care transitioned to comfort measures    Complaint of melena  Assessment & Plan  Patient was told to expect some bleeding for the next 2 weeks secondary to prostatic biopsy performed on 08/13  INR slightly elevated on admission, hemoglobin stable  Discontinued anticoagulants in favor of comfort measures    Alkaline phosphatase elevation  Assessment & Plan  Likely related to bony metastasis      * Acute hypoxemic respiratory failure due to COVID-19 [facility])  Assessment & Plan  Patient's goals of care transition to comfort measures due to continued deterioration per his stated wishes on admission  Dilaudid for pain/dyspnea  Ativan for agitation/anxiety          ----------------------------------------------------------------------------------------  HPI/24hr events:  Patient remains on comfort measures  Appear distressed overnight, medications increased  Appears more restful this morning  Disposition: Comfort Care  Code Status: Level 4 - Comfort Care  ---------------------------------------------------------------------------------------  SUBJECTIVE  Nonverbal    Review of Systems  Review of systems was reviewed and negative unless stated above in HPI/24-hour events   ---------------------------------------------------------------------------------------  OBJECTIVE    Vitals   Vitals:    21 1800 21 2055 21 0700 21 1100   BP: 119/66  118/73 113/79   BP Location:   Left arm Left arm   Pulse: (!) 114  (!) 112 (!) 134   Resp: 19  20 (!) 24   Temp:   98 1 °F (36 7 °C)    TempSrc:   Axillary    SpO2: 97% 97% (!) 89% 91%   Weight:       Height:         Temp (24hrs), Av 1 °F (36 7 °C), Min:98 1 °F (36 7 °C), Max:98 1 °F (36 7 °C)  Current: Temperature: 98 1 °F (36 7 °C)          Respiratory:  SpO2: SpO2: 91 %  Nasal Cannula O2 Flow Rate (L/min): 6 L/min    Invasive/non-invasive ventilation settings   Respiratory    Lab Data (Last 4 hours)    None         O2/Vent Data (Last 4 hours)    None                Physical Exam  GEN:  Unresponsive, ill appearing  CV :  S1S2, tachycardic no murmurs, rubs or gallops  Resp:  Lungs diminished throughout  No subq air or crepitus    Shallow respirations, agonal  GI :  Abdomen soft nontender no guarding or rebound absent bowel sounds  Neuro:  Unresponsive    Laboratory and Diagnostics:  Results from last 7 days   Lab Units 21  0611 21  0512 21  0446 21  0520 21  1143 21  0443 08/15/21  1230   WBC Thousand/uL 12 31* 11 55* 13 59* 16 40* 11 37* 10 95* 14 31*   HEMOGLOBIN g/dL 13 4 12 1 12 5 12 2 11 7* 12 2 14 5   HEMATOCRIT % 39 9 37 0 38 0 36 6 36 4* 38 1 44 6   PLATELETS Thousands/uL 272 269 279 254 228 232 251   NEUTROS PCT % 83*  --  88* 89*  --   --  91*   MONOS PCT % 6  --  5 4  -- --  4   MONO PCT %  --  3*  --   --   --  4  --      Results from last 7 days   Lab Units 08/20/21  0611 08/19/21  0512 08/18/21  0446 08/17/21  0520 08/16/21  0443 08/15/21  1230   SODIUM mmol/L 138 141 143 143 145 145   POTASSIUM mmol/L 4 3 4 4 4 1 3 8 3 8 3 6   CHLORIDE mmol/L 108 110* 111* 111* 112* 107   CO2 mmol/L 21 20* 21 19* 23 26   ANION GAP mmol/L 9 11 11 13 10 12   BUN mg/dL 26* 30* 32* 32* 32* 35*   CREATININE mg/dL 0 77 0 85 0 94 0 83 0 91 1 24   CALCIUM mg/dL 7 4* 7 4* 7 4* 7 1* 7 1* 7 6*   GLUCOSE RANDOM mg/dL 98 111 121 138 124 130   ALT U/L 87*  --  88* 89* 101* 84*   AST U/L 51*  --  53* 59* 123* 80*   ALK PHOS U/L 318*  --  317* 301* 328* 353*   ALBUMIN g/dL 1 9*  --  1 6* 1 4* 1 5* 1 8*   TOTAL BILIRUBIN mg/dL 0 45  --  0 46 0 39 0 58 0 81     Results from last 7 days   Lab Units 08/20/21  0611 08/19/21  0512 08/18/21 0446 08/17/21  0520 08/16/21  0443   MAGNESIUM mg/dL  --   --  2 1 2 2 2 4   PHOSPHORUS mg/dL 2 5 2 9 3 0 2 5 3 1      Results from last 7 days   Lab Units 08/19/21  0512 08/18/21  0033 08/17/21  1636 08/17/21  0844 08/17/21  0108 08/16/21  1822 08/16/21  1143 08/16/21  0443 08/15/21  1230   INR   --   --   --   --   --   --  1 54* 1 48* 1 57*   PTT seconds 32 102* 77* 50* 50* 44* 27  --  27      Results from last 7 days   Lab Units 08/15/21  1230   TROPONIN I ng/mL <0 02         ABG:    VBG:    Results from last 7 days   Lab Units 08/18/21  0446 08/17/21  1513 08/16/21  0443 08/15/21  1704   PROCALCITONIN ng/ml 0 19 0 25 0 76* 1 03*       Micro  Results from last 7 days   Lab Units 08/16/21  1156 08/15/21  1232 08/15/21  1231   BLOOD CULTURE   --  No Growth After 5 Days  No Growth After 5 Days  MRSA CULTURE ONLY  No Methicillin Resistant Staphlyococcus aureus (MRSA) isolated  --   --        EKG:  Sinus tach  Imaging: I have personally reviewed pertinent reports     and I have personally reviewed pertinent films in PACS    Intake and Output  I/O       08/20 0701 - 08/21 0700 08/21 0701 - 08/22 0700    Urine (mL/kg/hr) 550 (0 4)     Stool 0     Total Output 550     Net -550           Unmeasured Stool Occurrence 2 x           Height and Weights   Height: 5' 9" (175 3 cm)  IBW (Ideal Body Weight): 70 7 kg  Body mass index is 18 92 kg/m²  Weight (last 2 days)     None            Nutrition       Diet Orders   (From admission, onward)             Start     Ordered    08/20/21 1344  Diet NPO  Diet effective now     Question Answer Comment   Diet Type NPO    RD to adjust diet per protocol? Yes        08/20/21 1343                  Active Medications  Scheduled Meds:  Current Facility-Administered Medications   Medication Dose Route Frequency Provider Last Rate    HYDROmorphone  2 mg Intravenous Q1H PRN Patterson Specking, CRNP      LORazepam  1 mg Intravenous Q1H PRN Patterson Specking, CRNP      OLANZapine  5 mg Oral Q12H Bharati Harper, CRNP      ondansetron  4 mg Intravenous Q6H PRN Alex Ayala PA-C       Continuous Infusions:     PRN Meds:   HYDROmorphone, 2 mg, Q1H PRN  LORazepam, 1 mg, Q1H PRN  ondansetron, 4 mg, Q6H PRN        Invasive Devices Review  Invasive Devices     Peripheral Intravenous Line            Peripheral IV 08/16/21 Proximal;Right;Ventral (anterior) Forearm 5 days                Rationale for remaining devices:  Not applicable  ---------------------------------------------------------------------------------------  Advance Directive and Living Will:      Power of :    POLST:    ---------------------------------------------------------------------------------------  Care Time Delivered:   No Critical Care time spent       Regan Specking, CRNP      Portions of the record may have been created with voice recognition software  Occasional wrong word or "sound a like" substitutions may have occurred due to the inherent limitations of voice recognition software    Read the chart carefully and recognize, using context, where substitutions have occurred

## 2021-08-22 NOTE — CASE MANAGEMENT
Case Management Progress Note    Patient name Philippe Truong  Location / MRN [id number]  : 1955 Date 2021       LOS (days): 7  Geometric Mean LOS (GMLOS) (days): 4 80  Days to GMLOS:-2        BUNDLE:      OBJECTIVE:  Pt is a 77y o  year old /Civil Union, white or  [1], male with Voodoo preference of None admitted on 8/15/2021 11:55 AM  Pt is admitted to  at [facility] with complaints of Acute hypoxemic respiratory failure due to COVID-19 [facility])   Current admission status: Inpatient  Preferred Pharmacy:   [place] [address]  Phone: [phone] Fax: [phone]    Primary Care Provider: Chavo Alejandro MD    Primary Insurance: [employer] [facility]  Secondary Insurance:     PROGRESS NOTE:    Cm spoke w [facility] hospice liaison Moon Best RN on the way to hospital for evaluation  RN is [place] speaking

## 2021-08-22 NOTE — ACP (ADVANCE CARE PLANNING)
Hospice referral please  Reviewed with hospice liaison and deemed to be appropriate for IPU  This was discussed with patient's wife Katlyn Wylie who is having a difficult time with grieving process  She understands that we are no longer continuing curative treatment at request of the patient, and have transition goals of care towards one of comfort  She understands that he has been receiving medications for symptoms of pain, anxiety, air hunger, and is in agreement that she does not want to see Leonor Callejas suffer  She is a highly Scientologist woman and is hopeful he will be cured by a "miracle" but realizes that the "fate of her  is now in God's hand " Unfortunately Katlyn Wylie has also been confirmed COVID positive and cannot come in for palliative visit with her   We have provided emotional support and reassurance that we will continue to support Leonor Callejas and uphold his wishes while keeping her informed  She requests that we continue to keep Leonor Callejas in the hospital, and does not wish to pursue hospice at this time  Prior to this phone call with permission I did update Tahir's Kathy Rosario [phone] who ultimately is in agreement with hospice care supports the decision of Katlyn Wylie  I have also informed Micaela Borrero that we are in possession of Tahir's wallet that was inquire by JASM[facility] ST[facility] family  It has been accounted for and locked away by security       Ilir Azar PA-C

## 2021-08-22 NOTE — HOSPICE NOTE
Review patient status with Jasson SIDDIQUI rg patient wishes to be on Comfort care when his disease progress  Patient condition and his status in past 2 days  reviewed with Dr Cano by liaison Irvin Shane and patient approved for IPU  This nurse(translated and explained in [place]), PA  and Dr Hernandez had a conference call with spouse Jacob Ramirez and explain patient wishes and ability for patient to go to [facility] for specialty comfort care for patient and family, but spouse not ready and wishing for patient get better and not understanding comfort care approach at this time  We will f/u with hospital team on 8/23/21

## 2021-08-23 NOTE — DISCHARGE SUMMARY
Discharge Summary - Tim Pena 77 y o  male MRN: [id number]    Unit/Bed#:  Encounter: [id number] PCP: Cici Flores MD    Admission Date:   Admission Orders (From admission, onward)     Ordered        08/15/21 1354  Inpatient Admission  Once                     Admitting Diagnosis: Shortness of breath [R06 02]  Pneumonia [J18 9]  Acute respiratory failure with hypoxia ([facility] [address] ) [J96 01]  COVID-19 [U07 1]    HPI:  77 y o  male with a PMH of HTN who presents with hypoxia and SOB  Reported to the ED that he was having cough, SOB, and weakness for the past two weeks; however, told me he was not having any coughs but did have weakness and shortness of breath  He called EMS and patient was found to have spO2 of 74% on room air and was provided 6L O2 via nasal cannula and his oxygen sat returned to 90%  Patient has not had his COVID vaccination and has had no known contacts or any recent travel  He has not had any fevers or chills, abdominal pain, chest pain, swelling, pain in his extremities, headaches, urine output has been normal  He reports that he has not had an appetite and his smell and taste have decreased  He has felt dehydrated and has felt the need to increase his water intake  He did note melena that he has been experiencing but no diarrhea or constipation  Procedures Performed:   Orders Placed This Encounter   Procedures    Critical Care       Summary of Hospital Course:  Patient admitted as above  Goals of care conversation held on admission indicated the patient wanted DNR DNI status with transition to comfort if treatment up to and including high-flow nasal cannula was unsuccessful  Patient did attempt a trial of BiPAP, which he was unable to tolerate  Per the patient's wishes, he was transitioned to comfort measures and subsequently  at 8:00 p m  This day pronounced dead by me  Patient was attended by this provider in nursing staff in the last moments of his life, support provided  His wife and cousin Jorge Zendejas were notified by telephone      Significant Findings, Care, Treatment and Services Provided:  Critical care    Complications:  None    Disposition:      Final Diagnosis:  Acute hypoxemic respiratory failure secondary to COVID-19    Medical Problems     Resolved Problems  Date Reviewed: 2021    None                Condition at Time of Death:  Poor    Date, Time and Cause of Death    Date of Death: 21  Time of Death:  8:00 PM  Preliminary Cause of Death: Acute hypoxemic respiratory failure due to COVID-19 [facility])  Entered by: MEHUL Damon[EW1 1]     Attribution     EW1 1 Lenin Chen, [address] 21 20:16          Death Note:    INPATIENT DEATH NOTE  Lorelei Fisher 77 y o  male MRN: [id number]  Unit/Bed#:  Encounter: [id number]             PHYSICAL EXAM:  Unresponsive to noxious stimuli, Spontaneous respirations absent, Breath sounds absent, Carotid pulse absent and Heart sounds absent    Medical Examiner notification criteria:  NONE APPLICABLE   Medical Examiner's office notified?:  No, does not meet ME notification criteria   Medical Examiner accepted case?:  NA  Name of Medical Examiner: NA         Autopsy Options:  Post-mortem examination declined by next of kin    Primary Service Attending Physician notified?:  yes - Attending:  Khadijah Harris MD    Physician/Resident responsible for completing Discharge Summary:  Marry Smiley

## 2021-08-23 NOTE — QUICK NOTE
I never saw Mr Anneliese Wilcox  Death pronounced by Osvaldo Vallecillo, [address]  I completed death certificate based on chart information

## 2021-08-23 NOTE — DEATH NOTE
INPATIENT DEATH NOTE  Domitila Wallace 77 y o  male MRN: [id number]  Unit/Bed#:  Encounter: [id number]             PHYSICAL EXAM:  Unresponsive to noxious stimuli, Spontaneous respirations absent, Breath sounds absent, Carotid pulse absent and Heart sounds absent    Medical Examiner notification criteria:  NONE APPLICABLE   Medical Examiner's office notified?:  No, does not meet ME notification criteria   Medical Examiner accepted case?:  NA  Name of Medical Examiner: NA         Autopsy Options:  Post-mortem examination declined by next of kin    Primary Service Attending Physician notified?:  yes - Attending:  Smooth Levine MD    Physician/Resident responsible for completing Discharge Summary:  Torrie Capone

## 2021-09-03 NOTE — UTILIZATION REVIEW
Notification of Discharge   This is a Notification of Discharge from our facility [address]  Please be advised that this patient has been discharge from our facility  Below you will find the admission and discharge date and time including the patients disposition  UTILIZATION REVIEW CONTACT:  Brown Muhammad  Utilization   Network Utilization Review Department  Phone: [phone] x carefully listen to the prompts  All voicemails are confidential   Email: [email or web address]     PHYSICIAN ADVISORY SERVICES:  FOR [id number]-[id number] REVIEW - MEDICAL NECESSITY DENIAL  Phone: [phone]  Fax: [phone]  Email: [email or web address]     PRESENTATION DATE: 8/15/2021 11:55 AM  OBERVATION ADMISSION DATE:   INPATIENT ADMISSION DATE: 8/15/21  1:54 PM   DISCHARGE DATE: 2021 10:30 PM  DISPOSITION:        IMPORTANT INFORMATION:  Send all requests for admission clinical reviews, approved or denied determinations and any other requests to dedicated fax number below belonging to the campus where the patient is receiving treatment   List of dedicated fax numbers:  [address] DENIALS (Administrative/Medical Necessity) [phone]   [address] (Maternity/NICU/Pediatrics) [phone]   Diaz Petty [phone]   Jovanni Quiroga [phone]   [facility] [phone]   [address] [phone]   [facility]  [phone]   [address] [facility]  [address] [facility] [address] [facility] [phone]

## 2024-07-10 NOTE — PROGRESS NOTES
[address] [place] [address]  Progress Note Melina Hernandez 1955, 77 y o  male MRN: [id number]  Unit/Bed#:  Encounter: [id number]  Primary Care Provider: María Benoit MD   Date and time admitted to hospital: 8/15/2021 11:55 AM    Comfort measures only status  Assessment & Plan  Supportive care  Code level 4    Severe protein-calorie malnutrition ([facility] [address] )  Assessment & Plan  Malnutrition Findings:     albumin less than 2, prominent clavicles       BMI Findings: Body mass index is 18 92 kg/m²  Unable to take p o  Secondary to altered level of consciousness    May-Thurner syndrome  Assessment & Plan  Bilateral lower extremity duplexes negative        Metastatic disease ([facility] [address] ), 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 primary  Assessment & Plan  Patient with imaging consistent with bony metastasis involving the ribs and pelvis, with likely prostate primary    Underwent biopsy on 08/13, pathology positive for prostatic adenocarcinoma      Essential hypertension  Assessment & Plan  Discontinued meds due to decreased level of consciousness  Goals of care transitioned to comfort measures    Complaint of melena  Assessment & Plan  Patient was told to expect some bleeding for the next 2 weeks secondary to prostatic biopsy performed on 08/13  INR slightly elevated on admission, hemoglobin stable  Discontinued anticoagulants in favor of comfort measures    Alkaline phosphatase elevation  Assessment & Plan  Likely related to bony metastasis      * Acute hypoxemic respiratory failure due to COVID-19 [facility])  Assessment & Plan  Patient's goals of care transition to comfort measures due to continued deterioration last evening  Dilaudid for pain/dyspnea  Ativan for agitation/anxiety        ----------------------------------------------------------------------------------------  HPI/24hr events:  Patient's condition deteriorated last evening, with increased work of breathing, agitation, and confusion despite high-flow nasal cannula 100% FiO2 at 60 L, and 100% non-rebreather mask added  Patient unable to tolerate BiPAP  Goals of care transition to comfort per his stated wishes on admission  Symptoms controlled with Dilaudid and Ativan overnight  Appears restful this morning  Disposition: Comfort Care  Code Status: Level 4 - Comfort Care  ---------------------------------------------------------------------------------------  SUBJECTIVE  Nonverbal    Review of Systems  Review of systems was reviewed and negative unless stated above in HPI/24-hour events   ---------------------------------------------------------------------------------------  OBJECTIVE    Vitals   Vitals:    21 1553 21 1600 21 1800 21   BP:  125/77 119/66    BP Location:       Pulse:  (!) 126 (!) 114    Resp:  (!) 26 19    Temp:       TempSrc:       SpO2: 95% 97% 97% 97%   Weight:       Height:         Temp (24hrs), Av 2 °F (36 8 °C), Min:98 1 °F (36 7 °C), Max:98 2 °F (36 8 °C)  Current: Temperature: 98 1 °F (36 7 °C)          Respiratory:  SpO2: SpO2: 97 %  Nasal Cannula O2 Flow Rate (L/min): 6 L/min    Invasive/non-invasive ventilation settings   Respiratory    Lab Data (Last 4 hours)    None         O2/Vent Data (Last 4 hours)    None                Physical Exam  GEN:  Ill appearing, lethargic, minimally responsive, appears comfortable  HEENT:  Sclera anicteric, mucous membranes pink and moist, conjunctiva pink, no ezequiel/rhinorrhea  Neck:  normal ROM, supple, no bruits  CV :  S1S2, regular, tachycardic no murmurs, rubs or gallops  Intact distal pulses  No JVD  Resp:  Lungs diminished throughout  No subq air or crepitus  Symmetrical expansion    GI :  Abd soft, nontender, no guarding/rebound, nondistended, normoactive bowel sounds X4 quads, no organomegaly appreciated  Neuro:  Minimally responsive to painful stimuli      Laboratory and Diagnostics:  Results from last 7 days   Lab Units 21  0611 21  0512 21  0446 08/17/21  0520 08/16/21  1143 08/16/21  0443 08/15/21  1230   WBC Thousand/uL 12 31* 11 55* 13 59* 16 40* 11 37* 10 95* 14 31*   HEMOGLOBIN g/dL 13 4 12 1 12 5 12 2 11 7* 12 2 14 5   HEMATOCRIT % 39 9 37 0 38 0 36 6 36 4* 38 1 44 6   PLATELETS Thousands/uL 272 269 279 254 228 232 251   NEUTROS PCT % 83*  --  88* 89*  --   --  91*   MONOS PCT % 6  --  5 4  --   --  4   MONO PCT %  --  3*  --   --   --  4  --      Results from last 7 days   Lab Units 08/20/21  0611 08/19/21  0512 08/18/21  0446 08/17/21  0520 08/16/21  0443 08/15/21  1230   SODIUM mmol/L 138 141 143 143 145 145   POTASSIUM mmol/L 4 3 4 4 4 1 3 8 3 8 3 6   CHLORIDE mmol/L 108 110* 111* 111* 112* 107   CO2 mmol/L 21 20* 21 19* 23 26   ANION GAP mmol/L 9 11 11 13 10 12   BUN mg/dL 26* 30* 32* 32* 32* 35*   CREATININE mg/dL 0 77 0 85 0 94 0 83 0 91 1 24   CALCIUM mg/dL 7 4* 7 4* 7 4* 7 1* 7 1* 7 6*   GLUCOSE RANDOM mg/dL 98 111 121 138 124 130   ALT U/L 87*  --  88* 89* 101* 84*   AST U/L 51*  --  53* 59* 123* 80*   ALK PHOS U/L 318*  --  317* 301* 328* 353*   ALBUMIN g/dL 1 9*  --  1 6* 1 4* 1 5* 1 8*   TOTAL BILIRUBIN mg/dL 0 45  --  0 46 0 39 0 58 0 81     Results from last 7 days   Lab Units 08/20/21  0611 08/19/21  0512 08/18/21  0446 08/17/21  0520 08/16/21  0443   MAGNESIUM mg/dL  --   --  2 1 2 2 2 4   PHOSPHORUS mg/dL 2 5 2 9 3 0 2 5 3 1      Results from last 7 days   Lab Units 08/19/21  0512 08/18/21  0033 08/17/21  1636 08/17/21  0844 08/17/21  0108 08/16/21  1822 08/16/21  1143 08/16/21  0443 08/15/21  1230   INR   --   --   --   --   --   --  1 54* 1 48* 1 57*   PTT seconds 32 102* 77* 50* 50* 44* 27  --  27      Results from last 7 days   Lab Units 08/15/21  1230   TROPONIN I ng/mL <0 02         ABG:    VBG:    Results from last 7 days   Lab Units 08/18/21  0446 08/17/21  1513 08/16/21  0443 08/15/21  1704   PROCALCITONIN ng/ml 0 19 0 25 0 76* 1 03*       Micro  Results from last 7 days   Lab Units 08/16/21  1156 08/15/21  1232 08/15/21  1231   BLOOD CULTURE   --  No Growth After 5 Days  No Growth After 5 Days  MRSA CULTURE ONLY  No Methicillin Resistant Staphlyococcus aureus (MRSA) isolated  --   --        EKG:  Sinus tach  Imaging: I have personally reviewed pertinent reports  and I have personally reviewed pertinent films in PACS    Intake and Output  I/O       08/19 0701 - 08/20 0700 08/20 0701 - 08/21 0700    Urine (mL/kg/hr) 475 (0 3) 550 (0 4)    Stool  0    Total Output 475 550    Net -475 -550          Unmeasured Stool Occurrence  2 x          Height and Weights   Height: 5' 9" (175 3 cm)  IBW (Ideal Body Weight): 70 7 kg  Body mass index is 18 92 kg/m²  Weight (last 2 days)     None            Nutrition       Diet Orders   (From admission, onward)             Start     Ordered    08/20/21 1344  Diet NPO  Diet effective now     Question Answer Comment   Diet Type NPO    RD to adjust diet per protocol?  Yes        08/20/21 1343                  Active Medications  Scheduled Meds:  Current Facility-Administered Medications   Medication Dose Route Frequency Provider Last Rate    HYDROmorphone  1 mg Intravenous Q1H PRN Hodge Quest, CRNP      LORazepam  0 5 mg Intravenous Q1H PRN Hodge Quest, CRNP      OLANZapine  5 mg Oral Q12H Bharati Harper, CRNP      ondansetron  4 mg Intravenous Q6H PRN Paz Pinzon PA-C       Continuous Infusions:     PRN Meds:   HYDROmorphone, 1 mg, Q1H PRN  LORazepam, 0 5 mg, Q1H PRN  ondansetron, 4 mg, Q6H PRN        Invasive Devices Review  Invasive Devices     Peripheral Intravenous Line            Peripheral IV 08/16/21 Proximal;Right;Ventral (anterior) Forearm 4 days                Rationale for remaining devices:  Nonapplicable  ---------------------------------------------------------------------------------------  Advance Directive and Living Will:      Power of :    POLST: ---------------------------------------------------------------------------------------  Care Time Delivered:   No Critical Care time spent       [facility]      Portions of the record may have been created with voice recognition software  Occasional wrong word or "sound a like" substitutions may have occurred due to the inherent limitations of voice recognition software    Read the chart carefully and recognize, using context, where substitutions have occurred No